# Patient Record
Sex: MALE | Race: BLACK OR AFRICAN AMERICAN | Employment: UNEMPLOYED
[De-identification: names, ages, dates, MRNs, and addresses within clinical notes are randomized per-mention and may not be internally consistent; named-entity substitution may affect disease eponyms.]

---

## 2017-03-10 ENCOUNTER — OFFICE VISIT (OUTPATIENT)
Dept: INTERNAL MEDICINE | Facility: CLINIC | Age: 47
End: 2017-03-10

## 2017-03-10 VITALS
WEIGHT: 175 LBS | SYSTOLIC BLOOD PRESSURE: 128 MMHG | BODY MASS INDEX: 27.47 KG/M2 | HEART RATE: 80 BPM | HEIGHT: 67 IN | DIASTOLIC BLOOD PRESSURE: 75 MMHG

## 2017-03-10 DIAGNOSIS — G40.909 SEIZURE DISORDER (HCC): ICD-10-CM

## 2017-03-10 DIAGNOSIS — M54.50 CHRONIC BILATERAL LOW BACK PAIN WITHOUT SCIATICA: ICD-10-CM

## 2017-03-10 DIAGNOSIS — E78.00 PURE HYPERCHOLESTEROLEMIA: Primary | ICD-10-CM

## 2017-03-10 DIAGNOSIS — Z23 NEED FOR TDAP VACCINATION: ICD-10-CM

## 2017-03-10 DIAGNOSIS — G89.29 CHRONIC BILATERAL LOW BACK PAIN WITHOUT SCIATICA: ICD-10-CM

## 2017-03-10 PROCEDURE — 99396 PREV VISIT EST AGE 40-64: CPT | Performed by: INTERNAL MEDICINE

## 2017-03-10 PROCEDURE — 90471 IMMUNIZATION ADMIN: CPT | Performed by: INTERNAL MEDICINE

## 2017-03-10 PROCEDURE — 90715 TDAP VACCINE 7 YRS/> IM: CPT | Performed by: INTERNAL MEDICINE

## 2017-03-10 RX ORDER — SENNOSIDES 8.6 MG
650 CAPSULE ORAL EVERY 8 HOURS PRN
Qty: 60 TABLET | Refills: 2 | Status: SHIPPED | OUTPATIENT
Start: 2017-03-10 | End: 2019-08-27 | Stop reason: ALTCHOICE

## 2017-03-10 RX ORDER — PHENYTOIN SODIUM 100 MG/1
100 CAPSULE, EXTENDED RELEASE ORAL 2 TIMES DAILY
Qty: 60 CAPSULE | Refills: 2 | Status: SHIPPED | OUTPATIENT
Start: 2017-03-10 | End: 2019-08-27

## 2017-03-13 ENCOUNTER — TELEPHONE (OUTPATIENT)
Dept: INTERNAL MEDICINE | Age: 47
End: 2017-03-13

## 2017-03-13 RX ORDER — SILDENAFIL 50 MG/1
50 TABLET, FILM COATED ORAL DAILY PRN
Qty: 5 TABLET | Refills: 0 | Status: SHIPPED | OUTPATIENT
Start: 2017-03-13 | End: 2019-08-27 | Stop reason: ALTCHOICE

## 2017-06-21 ENCOUNTER — TELEPHONE (OUTPATIENT)
Dept: INTERNAL MEDICINE | Age: 47
End: 2017-06-21

## 2017-10-23 ENCOUNTER — TELEPHONE (OUTPATIENT)
Dept: INTERNAL MEDICINE | Age: 47
End: 2017-10-23

## 2017-12-01 ENCOUNTER — TELEPHONE (OUTPATIENT)
Dept: INTERNAL MEDICINE | Age: 47
End: 2017-12-01

## 2017-12-01 NOTE — LETTER
[address]  [address] [facility] 29 [facility]  Phone: [phone]  Fax: [phone]    Jenny Smith MD        December 1, 2017    Derick Romeo  1932 [address] [place] [address]      Dear Mohit Gallardo:    We are sending this letter because your PCP ordered [facility] for you to have done at your last visit here and they have not yet been completed. If you can please come to our office on the 2nd floor to  your orders and have your labs completed at [facility]. If you do not have a follow-up appointment scheduled you can either contact the office to make an appointment with us or you can make one when you come in to pick-up your orders. If you have any questions or concerns, please don't hesitate to call.     Sincerely,        Jenny Smith MD

## 2019-04-23 ENCOUNTER — OFFICE VISIT (OUTPATIENT)
Dept: NEUROLOGY | Age: 49
End: 2019-04-23
Payer: MEDICARE

## 2019-04-23 VITALS
SYSTOLIC BLOOD PRESSURE: 129 MMHG | WEIGHT: 188.4 LBS | HEART RATE: 77 BPM | HEIGHT: 67 IN | DIASTOLIC BLOOD PRESSURE: 92 MMHG | BODY MASS INDEX: 29.57 KG/M2

## 2019-04-23 DIAGNOSIS — R06.83 SNORING: ICD-10-CM

## 2019-04-23 DIAGNOSIS — G40.909 SEIZURE DISORDER (HCC): ICD-10-CM

## 2019-04-23 DIAGNOSIS — R40.0 DAYTIME SLEEPINESS: ICD-10-CM

## 2019-04-23 DIAGNOSIS — R56.9 SEIZURES (HCC): ICD-10-CM

## 2019-04-23 DIAGNOSIS — R51.9 HEADACHE DISORDER: Primary | ICD-10-CM

## 2019-04-23 PROCEDURE — 1036F TOBACCO NON-USER: CPT | Performed by: PSYCHIATRY & NEUROLOGY

## 2019-04-23 PROCEDURE — 99205 OFFICE O/P NEW HI 60 MIN: CPT | Performed by: PSYCHIATRY & NEUROLOGY

## 2019-04-23 PROCEDURE — G8419 CALC BMI OUT NRM PARAM NOF/U: HCPCS | Performed by: PSYCHIATRY & NEUROLOGY

## 2019-04-23 PROCEDURE — G8427 DOCREV CUR MEDS BY ELIG CLIN: HCPCS | Performed by: PSYCHIATRY & NEUROLOGY

## 2019-04-23 RX ORDER — RIZATRIPTAN BENZOATE 5 MG/1
5 TABLET, ORALLY DISINTEGRATING ORAL
Qty: 30 TABLET | Refills: 3 | Status: SHIPPED | OUTPATIENT
Start: 2019-04-23 | End: 2019-08-27

## 2019-04-23 RX ORDER — MAGNESIUM OXIDE 400 MG/1
400 TABLET ORAL DAILY
Qty: 30 TABLET | Refills: 1 | Status: SHIPPED | OUTPATIENT
Start: 2019-04-23 | End: 2019-06-13 | Stop reason: ALTCHOICE

## 2019-04-23 NOTE — PATIENT INSTRUCTIONS
1. MRI brain w/wo  2. EEG routine  3. Magnesium oxide 400mg daily for headache prevention, hold if loose stool  4. Maxalt 5mg at time of headache is about to start, may repeat once in 2 hours, no more than two pills a day, no more than 2 days a week  5. Limit tylenol use  6. Keep headache log, seizure precaution  7. Sleep study  8.  Blood work     Return in 6-8 weeks    Yoana Das MD, MS
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
## 2019-05-16 ENCOUNTER — HOSPITAL ENCOUNTER (OUTPATIENT)
Dept: SLEEP CENTER | Age: 49
Discharge: HOME | End: 2019-05-18
Payer: MEDICARE

## 2019-05-16 DIAGNOSIS — R06.83 SNORING: ICD-10-CM

## 2019-05-16 DIAGNOSIS — R40.0 DAYTIME SLEEPINESS: ICD-10-CM

## 2019-05-16 PROCEDURE — 95810 POLYSOM 6/> YRS 4/> PARAM: CPT

## 2019-05-30 ENCOUNTER — HOSPITAL ENCOUNTER (OUTPATIENT)
Dept: NEUROLOGY | Age: 49
Discharge: HOME | End: 2019-05-30
Payer: MEDICARE

## 2019-05-30 ENCOUNTER — HOSPITAL ENCOUNTER (OUTPATIENT)
Dept: MRI IMAGING | Age: 49
Discharge: HOME | End: 2019-06-01
Payer: MEDICARE

## 2019-05-30 DIAGNOSIS — R56.9 SEIZURES (HCC): ICD-10-CM

## 2019-05-30 PROCEDURE — 95816 EEG AWAKE AND DROWSY: CPT

## 2019-05-30 PROCEDURE — 70553 MRI BRAIN STEM W/O & W/DYE: CPT

## 2019-05-30 PROCEDURE — 95816 EEG AWAKE AND DROWSY: CPT | Performed by: PSYCHIATRY & NEUROLOGY

## 2019-05-30 PROCEDURE — A9579 GAD-BASE MR CONTRAST NOS,1ML: HCPCS | Performed by: PSYCHIATRY & NEUROLOGY

## 2019-05-30 PROCEDURE — 6360000004 HC RX CONTRAST MEDICATION: Performed by: PSYCHIATRY & NEUROLOGY

## 2019-05-30 RX ADMIN — GADOTERIDOL 18 ML: 279.3 INJECTION, SOLUTION INTRAVENOUS at 10:38

## 2019-05-31 NOTE — PROCEDURES
[address] [facility] [address]   [address], [address] [facility]      ELECTROENCEPHALOGRAM REPORT        REFERRING PHYSICIAN:  Tami Spain MD  PATIENT NAME:Eugene Benavides. PATIENT MRN: [id number]  DATE OF EE2019    BRIEF HISTORY: 53year old RH AAM presented with seizure like activity, EEG to evaluate. CURRENT ANTI-EPILEPTIC MEDICATIONS: None    EEG DESCRIPTION:   During maximal wakefulness, the background was organized and continuous consisting of an admixture of frequency of alpha, beta and theta ranging between 10-50uV. There was a posterior dominant alpha rhythm at 10-11 Hz, which was symmetric and reactive to eye opening/eye closure. Low amplitude 13-18 Hz beta rhythms were seen symmetrically over the frontal-central head regions. Spontaneous variability to stimulation were present. No persistent focal asymmetries or abnormalities were seen. No epileptiform discharges were recorded. No clinical or electrographic seizures were recorded. Stage I sleep were recorded with alpha dropout, slow rolling eye movements, and high voltage centrally predominant vertex waves. Hyperventilation and photic stimulation did not activate abnormal activity. Heart rate was regular at 60-70 beats per minute on a single lead EKG. CLASSIFICATION:  Normal (Awake, drowsy)    IMPRESSION:  This was a normal routine awake and drowsy EEG. There is no epileptiform discharges or EEG seizures on this recording.      Tami Spain MD, [address] [place] [address], Neurology  Board Certified Epileptologist

## 2019-06-10 LAB — STATUS: NORMAL

## 2019-06-13 ENCOUNTER — OFFICE VISIT (OUTPATIENT)
Dept: NEUROLOGY | Age: 49
End: 2019-06-13
Payer: MEDICARE

## 2019-06-13 VITALS
DIASTOLIC BLOOD PRESSURE: 73 MMHG | HEART RATE: 75 BPM | HEIGHT: 67 IN | SYSTOLIC BLOOD PRESSURE: 117 MMHG | BODY MASS INDEX: 28.56 KG/M2 | WEIGHT: 182 LBS

## 2019-06-13 DIAGNOSIS — R51.9 HEADACHE DISORDER: Primary | ICD-10-CM

## 2019-06-13 DIAGNOSIS — M54.50 BACK PAIN AT L4-L5 LEVEL: ICD-10-CM

## 2019-06-13 DIAGNOSIS — R56.9 SEIZURE-LIKE ACTIVITY (HCC): ICD-10-CM

## 2019-06-13 PROCEDURE — G8427 DOCREV CUR MEDS BY ELIG CLIN: HCPCS | Performed by: PSYCHIATRY & NEUROLOGY

## 2019-06-13 PROCEDURE — 99214 OFFICE O/P EST MOD 30 MIN: CPT | Performed by: PSYCHIATRY & NEUROLOGY

## 2019-06-13 PROCEDURE — 1036F TOBACCO NON-USER: CPT | Performed by: PSYCHIATRY & NEUROLOGY

## 2019-06-13 PROCEDURE — G8419 CALC BMI OUT NRM PARAM NOF/U: HCPCS | Performed by: PSYCHIATRY & NEUROLOGY

## 2019-06-13 RX ORDER — TOPIRAMATE 25 MG/1
TABLET ORAL
Qty: 120 TABLET | Refills: 2 | Status: SHIPPED | OUTPATIENT
Start: 2019-06-13 | End: 2019-08-27
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
## 2019-08-27 ENCOUNTER — OFFICE VISIT (OUTPATIENT)
Dept: NEUROLOGY | Age: 49
End: 2019-08-27
Payer: MEDICARE

## 2019-08-27 VITALS
HEART RATE: 60 BPM | HEIGHT: 67 IN | SYSTOLIC BLOOD PRESSURE: 128 MMHG | DIASTOLIC BLOOD PRESSURE: 70 MMHG | WEIGHT: 180 LBS | BODY MASS INDEX: 28.25 KG/M2

## 2019-08-27 DIAGNOSIS — M54.50 BACK PAIN AT L4-L5 LEVEL: ICD-10-CM

## 2019-08-27 DIAGNOSIS — R56.9 SEIZURE-LIKE ACTIVITY (HCC): ICD-10-CM

## 2019-08-27 DIAGNOSIS — R51.9 HEADACHE DISORDER: Primary | ICD-10-CM

## 2019-08-27 PROCEDURE — G8428 CUR MEDS NOT DOCUMENT: HCPCS | Performed by: PSYCHIATRY & NEUROLOGY

## 2019-08-27 PROCEDURE — 1036F TOBACCO NON-USER: CPT | Performed by: PSYCHIATRY & NEUROLOGY

## 2019-08-27 PROCEDURE — 99214 OFFICE O/P EST MOD 30 MIN: CPT | Performed by: PSYCHIATRY & NEUROLOGY

## 2019-08-27 PROCEDURE — G8419 CALC BMI OUT NRM PARAM NOF/U: HCPCS | Performed by: PSYCHIATRY & NEUROLOGY

## 2019-08-27 RX ORDER — LIDOCAINE 5% 5 G/100G
30 CREAM TOPICAL 4 TIMES DAILY
Qty: 60 G | Refills: 5 | Status: SHIPPED | OUTPATIENT
Start: 2019-08-27 | End: 2019-08-28 | Stop reason: SDUPTHER

## 2019-08-27 NOTE — PATIENT INSTRUCTIONS
1. LTME   2. Vitamin B2 for headache prevention  3. Continue maxalat 5mg at time of headache is about to start, may repeat once in 2 hours, no more than 2 days a week  4.  Lidocaine cream to your back 4-5 times a day    Return after Omari Israel MD, MS
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
## 2019-08-28 RX ORDER — LIDOCAINE 40 MG/G
CREAM TOPICAL
Qty: 45 G | Refills: 5 | Status: SHIPPED | OUTPATIENT
Start: 2019-08-28

## 2019-09-12 ENCOUNTER — TELEPHONE (OUTPATIENT)
Dept: NEUROLOGY | Age: 49
End: 2019-09-12

## 2019-09-12 NOTE — TELEPHONE ENCOUNTER
Dr. Debo Allred had ordered LTME for Mr. Amber Núñez at his last follow up. I had discussed this with the patient that same day and he scheduled the LTME for 9/17/19. I placed a call to Mr. Amber Núñez today to confirm his admission for next week. Patient stated that he did not want to do this right now. I asked if he wanted to reschedule and he stated \"ya but not right now I'm not feeling good\". Patient stated that he would call me back to reschedule.

## 2019-10-26 ENCOUNTER — HOSPITAL ENCOUNTER (EMERGENCY)
Age: 49
Discharge: HOME | End: 2019-10-26
Attending: EMERGENCY MEDICINE
Payer: MEDICARE

## 2019-10-26 ENCOUNTER — APPOINTMENT (OUTPATIENT)
Dept: GENERAL RADIOLOGY | Age: 49
End: 2019-10-26
Payer: MEDICARE

## 2019-10-26 VITALS
SYSTOLIC BLOOD PRESSURE: 157 MMHG | RESPIRATION RATE: 18 BRPM | HEART RATE: 76 BPM | HEIGHT: 66 IN | TEMPERATURE: 98 F | OXYGEN SATURATION: 98 % | DIASTOLIC BLOOD PRESSURE: 83 MMHG | BODY MASS INDEX: 28.93 KG/M2 | WEIGHT: 180 LBS

## 2019-10-26 DIAGNOSIS — S39.012A STRAIN OF LUMBAR REGION, INITIAL ENCOUNTER: ICD-10-CM
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
## 2019-10-26 PROCEDURE — 99284 EMERGENCY DEPT VISIT MOD MDM: CPT

## 2019-10-26 PROCEDURE — 72100 X-RAY EXAM L-S SPINE 2/3 VWS: CPT

## 2019-10-26 RX ORDER — METHOCARBAMOL 750 MG/1
750 TABLET, FILM COATED ORAL 3 TIMES DAILY
Qty: 30 TABLET | Refills: 0 | Status: SHIPPED | OUTPATIENT
Start: 2019-10-26

## 2019-10-26 RX ORDER — IBUPROFEN 800 MG/1
800 TABLET ORAL EVERY 8 HOURS PRN
Qty: 15 TABLET | Refills: 0 | Status: SHIPPED | OUTPATIENT
Start: 2019-10-26

## 2019-10-26 ASSESSMENT — PAIN DESCRIPTION - ORIENTATION: ORIENTATION: LOWER

## 2019-10-26 ASSESSMENT — ENCOUNTER SYMPTOMS
COLOR CHANGE: 0
FACIAL SWELLING: 0
BACK PAIN: 1
SHORTNESS OF BREATH: 0
ABDOMINAL PAIN: 0
NAUSEA: 0
TROUBLE SWALLOWING: 0
PHOTOPHOBIA: 0
EYE PAIN: 0
VOICE CHANGE: 0
VOMITING: 0

## 2019-10-26 ASSESSMENT — PAIN SCALES - GENERAL: PAINLEVEL_OUTOF10: 8

## 2019-10-26 ASSESSMENT — PAIN DESCRIPTION - LOCATION: LOCATION: BACK

## 2019-10-26 ASSESSMENT — PAIN DESCRIPTION - DESCRIPTORS: DESCRIPTORS: ACHING;SHARP;STABBING

## 2019-10-26 ASSESSMENT — PAIN DESCRIPTION - PAIN TYPE: TYPE: ACUTE PAIN;CHRONIC PAIN

## 2021-08-17 ENCOUNTER — OFFICE VISIT (OUTPATIENT)
Dept: URBAN - METROPOLITAN AREA CLINIC 46 | Facility: CLINIC | Age: 51
End: 2021-08-17

## 2021-08-28 ENCOUNTER — TELEPHONE ENCOUNTER (OUTPATIENT)
Dept: URBAN - METROPOLITAN AREA CLINIC 13 | Facility: CLINIC | Age: 51
End: 2021-08-28

## 2021-08-29 ENCOUNTER — TELEPHONE ENCOUNTER (OUTPATIENT)
Dept: URBAN - METROPOLITAN AREA CLINIC 13 | Facility: CLINIC | Age: 51
End: 2021-08-29

## 2024-02-07 ENCOUNTER — APPOINTMENT (OUTPATIENT)
Dept: GENERAL RADIOLOGY | Age: 54
End: 2024-02-07
Payer: COMMERCIAL

## 2024-02-07 ENCOUNTER — HOSPITAL ENCOUNTER (EMERGENCY)
Age: 54
Discharge: HOME | End: 2024-02-07
Attending: EMERGENCY MEDICINE
Payer: COMMERCIAL

## 2024-02-07 VITALS
BODY MASS INDEX: 28.25 KG/M2 | RESPIRATION RATE: 18 BRPM | HEART RATE: 90 BPM | DIASTOLIC BLOOD PRESSURE: 102 MMHG | OXYGEN SATURATION: 96 % | WEIGHT: 180 LBS | HEIGHT: 67 IN | TEMPERATURE: 98.2 F | SYSTOLIC BLOOD PRESSURE: 166 MMHG

## 2024-02-07 DIAGNOSIS — M67.911 ROTATOR CUFF DISORDER, RIGHT: Primary | ICD-10-CM

## 2024-02-07 PROCEDURE — 73030 X-RAY EXAM OF SHOULDER: CPT

## 2024-02-07 PROCEDURE — 6370000000 HC RX 637 (ALT 250 FOR IP): Performed by: EMERGENCY MEDICINE

## 2024-02-07 PROCEDURE — 99283 EMERGENCY DEPT VISIT LOW MDM: CPT

## 2024-02-07 RX ORDER — IBUPROFEN 800 MG/1
800 TABLET ORAL ONCE
Status: COMPLETED | OUTPATIENT
Start: 2024-02-07 | End: 2024-02-07

## 2024-02-07 RX ORDER — ACETAMINOPHEN 500 MG
1000 TABLET ORAL ONCE
Status: COMPLETED | OUTPATIENT
Start: 2024-02-07 | End: 2024-02-07

## 2024-02-07 RX ORDER — LIDOCAINE 4 G/G
1 PATCH TOPICAL ONCE
Status: DISCONTINUED | OUTPATIENT
Start: 2024-02-07 | End: 2024-02-07 | Stop reason: HOSPADM

## 2024-02-07 RX ORDER — LIDOCAINE 50 MG/G
1 PATCH TOPICAL DAILY
Qty: 7 PATCH | Refills: 0 | Status: SHIPPED | OUTPATIENT
Start: 2024-02-07 | End: 2024-02-14

## 2024-02-07 RX ORDER — IBUPROFEN 600 MG/1
600 TABLET ORAL EVERY 6 HOURS PRN
Qty: 20 TABLET | Refills: 0 | Status: SHIPPED | OUTPATIENT
Start: 2024-02-07 | End: 2024-02-12

## 2024-02-07 RX ORDER — ACETAMINOPHEN 500 MG
1000 TABLET ORAL EVERY 6 HOURS PRN
Qty: 42 TABLET | Refills: 0 | Status: SHIPPED | OUTPATIENT
Start: 2024-02-07 | End: 2024-02-12

## 2024-02-07 RX ADMIN — ACETAMINOPHEN 1000 MG: 500 TABLET ORAL at 14:47

## 2024-02-07 RX ADMIN — IBUPROFEN 800 MG: 800 TABLET, FILM COATED ORAL at 14:48

## 2024-02-07 ASSESSMENT — PAIN SCALES - GENERAL: PAINLEVEL_OUTOF10: 7

## 2024-02-07 ASSESSMENT — ENCOUNTER SYMPTOMS
SHORTNESS OF BREATH: 0
ABDOMINAL PAIN: 0

## 2024-02-07 ASSESSMENT — PAIN - FUNCTIONAL ASSESSMENT: PAIN_FUNCTIONAL_ASSESSMENT: 0-10

## 2024-02-07 NOTE — DISCHARGE INSTRUCTIONS
You were seen here for pain in your right shoulder.  This is likely secondary to a rotator cuff injury.  Your x-ray was unremarkable.  You were provided exercises in the back of this packet that will help improve the pain in your right shoulder.    You need to call and schedule follow-up appointment with your primary care provider for as soon as possible for further management of this right shoulder pain.    Return to the emergency department immediately if you experience worsening symptoms, develop any new symptoms, or if you have any other concerns.

## 2024-02-07 NOTE — ED PROVIDER NOTES
[facility] ED  Emergency Department Encounter  Emergency Medicine Resident     Pt Name:Eugene Ross Jr.  MRN: [id number]  Birthdate 1970  Date of evaluation: 2/7/24  PCP:  No primary care provider on file.  Note Started: 2:10 PM EST      CHIEF COMPLAINT       Chief Complaint   Patient presents with    Arm Pain       HISTORY OF PRESENT ILLNESS  (Location/Symptom, Timing/Onset, Context/Setting, Quality, Duration, Modifying Factors, Severity.)      Eugene Ross Jr. is a 53 y.o. male who presents with right shoulder pain.  Patient states he has been pain in the right shoulder for the past 2 weeks.  Patient denies any recent fall or trauma to the shoulder.  Patient is right-hand dominant.  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  Patient denies any chest pain or difficulty breathing.  Only complaint is localized pain in the right shoulder which is worse once he raises his arm above 90 degrees.    PAST MEDICAL / SURGICAL / SOCIAL / FAMILY HISTORY      has a past medical history of Acute kidney injury (HCC), Allergic rhinitis, Chronic back pain, Hyperlipidemia, Seizures (HCC), and Urinary incontinence.     has no past surgical history on file.    Social History     Socioeconomic History    Marital status: Single     Spouse name: Not on file    Number of children: Not on file    Years of education: Not on file    Highest education level: Not on file   Occupational History    Not on file   Tobacco Use    Smoking status: Never    Smokeless tobacco: Never   Vaping Use    Vaping Use: Never used   Substance and Sexual Activity    Alcohol use: Not Currently     Alcohol/week: 0.0 standard drinks of alcohol     Comment: occasionally in past    Drug use: No    Sexual activity: Yes     Partners: Female   Other Topics Concern    Not on file   Social History Narrative    Not on file     Social Determinants of Health  rhythm.      Pulses: Normal pulses.   Pulmonary:      Effort: Pulmonary effort is normal.      Breath sounds: Normal breath sounds.   Abdominal:      General: There is no distension.      Palpations: Abdomen is soft.      Tenderness: There is no abdominal tenderness. There is no guarding or rebound.   Musculoskeletal:      Comments: Range of motion intact.  Pain elicited once arm is abducted more than 90 degrees.  Pain elicited with other rotator cuff maneuvers as well.  No obvious deformity noted to the shoulder.  No swelling, warmth, or erythema noted.  Neurovascularly intact distally.   Skin:     General: Skin is warm.      Capillary Refill: Capillary refill takes less than 2 seconds.   Neurological:      Mental Status: He is alert and oriented to person, place, and time.   Psychiatric:         Mood and Affect: Mood normal.       DDX/DIAGNOSTIC RESULTS / EMERGENCY DEPARTMENT COURSE / MDM     Medical Decision Making  53-year-old male, presents to the emergency department due to right shoulder pain.  Patient states he has been pain in the right shoulder for the past 2 weeks.  Patient denies any recent fall or trauma to the shoulder.  Patient is right-hand dominant.  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  Patient denies any chest pain or difficulty breathing.  Only complaint is localized pain in the right shoulder which is worse once he raises his arm above 90 degrees.    On evaluation, patient is well-appearing, nontoxic, afebrile.  Lung sounds are clear and equal bilaterally, abdomen soft nontender.  Evaluation of the right shoulder does not reveal any deformity, swelling, warmth, or erythema.  Range of motion intact however there is pain elicited once arm is abducted more than 90 degrees.  Neurovascularly intact distally.  Discussed with patient that this is likely a rotator cuff injury given that he has repetitive

## 2024-02-07 NOTE — ED PROVIDER NOTES
[facility] ED     Emergency Department     Faculty Attestation    I performed a history and physical examination of the patient and discussed management with the resident. I reviewed the resident’s note and agree with the documented findings and plan of care. Any areas of disagreement are noted on the chart. I was personally present for the key portions of any procedures. I have documented in the chart those procedures where I was not present during the key portions. I have reviewed the emergency nurses triage note. I agree with the chief complaint, past medical history, past surgical history, allergies, medications, social and family history as documented unless otherwise noted below. For Physician Assistant/ Nurse Practitioner cases/documentation I have personally evaluated this patient and have completed at least one if not all key elements of the E/M (history, physical exam, and MDM). Additional findings are as noted.    Note Started: 2:17 PM EST    Patient with right shoulder pain rating down the right arm no neck pain no chest pain trouble breathing.  Is right-handed.  No injury or trauma.  On exam decreased range of motion with spasm does have pain with testing rotator cuff but no deficits no deformity.  Distally strong pulses no neurodeficits.  Given no prior history will image plan discharge will need follow-up as an outpatient for rotator cuff pathology unless imaging reveals other etiology      Critical Care     none    Marty Arias MD, FACEP, FAAEM  Attending Emergency  Physician           Marty Arias MD  02/07/24 [id number]

## 2024-02-20 ENCOUNTER — APPOINTMENT (OUTPATIENT)
Dept: CT IMAGING | Age: 54
DRG: 053 | End: 2024-02-20
Payer: MEDICAID

## 2024-02-20 ENCOUNTER — HOSPITAL ENCOUNTER (INPATIENT)
Age: 54
LOS: 2 days | Discharge: HOME | DRG: 053 | End: 2024-02-22
Attending: EMERGENCY MEDICINE | Admitting: PSYCHIATRY & NEUROLOGY
Payer: MEDICAID

## 2024-02-20 DIAGNOSIS — R51.9 ACUTE NONINTRACTABLE HEADACHE, UNSPECIFIED HEADACHE TYPE: Primary | ICD-10-CM

## 2024-02-20 DIAGNOSIS — R56.9 SEIZURE (HCC): ICD-10-CM

## 2024-02-20 DIAGNOSIS — R56.9 SEIZURES (HCC): ICD-10-CM

## 2024-02-20 LAB
ALBUMIN SERPL-MCNC: 4.3 G/DL (ref 3.5–5.2)
ALBUMIN/GLOB SERPL: 1.3 {RATIO} (ref 1–2.5)
ALP SERPL-CCNC: 116 U/L (ref 40–129)
ALT SERPL-CCNC: 30 U/L (ref 5–41)
ANION GAP SERPL CALCULATED.3IONS-SCNC: 13 MMOL/L (ref 9–17)
APAP SERPL-MCNC: <5 UG/ML (ref 10–30)
AST SERPL-CCNC: 23 U/L
BASOPHILS # BLD: 0.04 K/UL (ref 0–0.2)
BASOPHILS NFR BLD: 1 % (ref 0–2)
BILIRUB SERPL-MCNC: 0.2 MG/DL (ref 0.3–1.2)
BILIRUB UR QL STRIP: NEGATIVE
BUN SERPL-MCNC: 10 MG/DL (ref 6–20)
CALCIUM SERPL-MCNC: 9.5 MG/DL (ref 8.6–10.4)
CHLORIDE SERPL-SCNC: 100 MMOL/L (ref 98–107)
CK SERPL-CCNC: 129 U/L (ref 39–308)
CLARITY UR: CLEAR
CO2 SERPL-SCNC: 24 MMOL/L (ref 20–31)
COLOR UR: YELLOW
COMMENT: NORMAL
CREAT SERPL-MCNC: 0.9 MG/DL (ref 0.7–1.2)
EOSINOPHIL # BLD: 0.1 K/UL (ref 0–0.44)
EOSINOPHILS RELATIVE PERCENT: 2 % (ref 1–4)
ERYTHROCYTE [DISTWIDTH] IN BLOOD BY AUTOMATED COUNT: 12.3 % (ref 11.8–14.4)
ETHANOL PERCENT: <0.01 %
ETHANOLAMINE SERPL-MCNC: <10 MG/DL
GFR SERPL CREATININE-BSD FRML MDRD: >60 ML/MIN/1.73M2
GLUCOSE SERPL-MCNC: 106 MG/DL (ref 70–99)
GLUCOSE UR STRIP-MCNC: NEGATIVE MG/DL
HCT VFR BLD AUTO: 47 % (ref 40.7–50.3)
HGB BLD-MCNC: 15.7 G/DL (ref 13–17)
HGB UR QL STRIP.AUTO: NEGATIVE
IMM GRANULOCYTES # BLD AUTO: <0.03 K/UL (ref 0–0.3)
IMM GRANULOCYTES NFR BLD: 0 %
KETONES UR STRIP-MCNC: NEGATIVE MG/DL
LEUKOCYTE ESTERASE UR QL STRIP: NEGATIVE
LYMPHOCYTES NFR BLD: 2.94 K/UL (ref 1.1–3.7)
LYMPHOCYTES RELATIVE PERCENT: 47 % (ref 24–43)
MCH RBC QN AUTO: 29 PG (ref 25.2–33.5)
MCHC RBC AUTO-ENTMCNC: 33.4 G/DL (ref 28.4–34.8)
MCV RBC AUTO: 86.9 FL (ref 82.6–102.9)
MONOCYTES NFR BLD: 0.34 K/UL (ref 0.1–1.2)
MONOCYTES NFR BLD: 6 % (ref 3–12)
MYOGLOBIN SERPL-MCNC: 23 NG/ML (ref 28–72)
NEUTROPHILS NFR BLD: 44 % (ref 36–65)
NEUTS SEG NFR BLD: 2.73 K/UL (ref 1.5–8.1)
NITRITE UR QL STRIP: NEGATIVE
NRBC BLD-RTO: 0 PER 100 WBC
PH UR STRIP: 7 [PH] (ref 5–8)
PLATELET # BLD AUTO: 303 K/UL (ref 138–453)
PMV BLD AUTO: 9 FL (ref 8.1–13.5)
POTASSIUM SERPL-SCNC: 3.7 MMOL/L (ref 3.7–5.3)
PROT SERPL-MCNC: 7.6 G/DL (ref 6.4–8.3)
PROT UR STRIP-MCNC: NEGATIVE MG/DL
RBC # BLD AUTO: 5.41 M/UL (ref 4.21–5.77)
SALICYLATES SERPL-MCNC: <1 MG/DL (ref 3–10)
SODIUM SERPL-SCNC: 137 MMOL/L (ref 135–144)
SP GR UR STRIP: 1.02 (ref 1–1.03)
TOXIC TRICYCLIC SC,BLOOD: NEGATIVE
UROBILINOGEN UR STRIP-ACNC: NORMAL EU/DL (ref 0–1)
WBC OTHER # BLD: 6.2 K/UL (ref 3.5–11.3)

## 2024-02-20 PROCEDURE — 83874 ASSAY OF MYOGLOBIN: CPT

## 2024-02-20 PROCEDURE — 80307 DRUG TEST PRSMV CHEM ANLYZR: CPT

## 2024-02-20 PROCEDURE — 2580000003 HC RX 258: Performed by: STUDENT IN AN ORGANIZED HEALTH CARE EDUCATION/TRAINING PROGRAM

## 2024-02-20 PROCEDURE — 80053 COMPREHEN METABOLIC PANEL: CPT

## 2024-02-20 PROCEDURE — 80179 DRUG ASSAY SALICYLATE: CPT

## 2024-02-20 PROCEDURE — 2060000000 HC ICU INTERMEDIATE R&B

## 2024-02-20 PROCEDURE — 93005 ELECTROCARDIOGRAM TRACING: CPT | Performed by: STUDENT IN AN ORGANIZED HEALTH CARE EDUCATION/TRAINING PROGRAM

## 2024-02-20 PROCEDURE — 82550 ASSAY OF CK (CPK): CPT

## 2024-02-20 PROCEDURE — 80143 DRUG ASSAY ACETAMINOPHEN: CPT

## 2024-02-20 PROCEDURE — 70450 CT HEAD/BRAIN W/O DYE: CPT

## 2024-02-20 PROCEDURE — 85025 COMPLETE CBC W/AUTO DIFF WBC: CPT

## 2024-02-20 PROCEDURE — 99285 EMERGENCY DEPT VISIT HI MDM: CPT

## 2024-02-20 PROCEDURE — 96365 THER/PROPH/DIAG IV INF INIT: CPT

## 2024-02-20 PROCEDURE — 96366 THER/PROPH/DIAG IV INF ADDON: CPT

## 2024-02-20 PROCEDURE — 81003 URINALYSIS AUTO W/O SCOPE: CPT

## 2024-02-20 PROCEDURE — 96375 TX/PRO/DX INJ NEW DRUG ADDON: CPT

## 2024-02-20 PROCEDURE — G0480 DRUG TEST DEF 1-7 CLASSES: HCPCS

## 2024-02-20 PROCEDURE — 6360000002 HC RX W HCPCS: Performed by: STUDENT IN AN ORGANIZED HEALTH CARE EDUCATION/TRAINING PROGRAM

## 2024-02-20 RX ORDER — LEVETIRACETAM 10 MG/ML
1000 INJECTION INTRAVASCULAR ONCE
Status: COMPLETED | OUTPATIENT
Start: 2024-02-20 | End: 2024-02-20

## 2024-02-20 RX ORDER — DIPHENHYDRAMINE HYDROCHLORIDE 50 MG/ML
25 INJECTION INTRAMUSCULAR; INTRAVENOUS ONCE
Status: COMPLETED | OUTPATIENT
Start: 2024-02-20 | End: 2024-02-20

## 2024-02-20 RX ORDER — METOCLOPRAMIDE HYDROCHLORIDE 5 MG/ML
10 INJECTION INTRAMUSCULAR; INTRAVENOUS ONCE
Status: COMPLETED | OUTPATIENT
Start: 2024-02-20 | End: 2024-02-20

## 2024-02-20 RX ORDER — SODIUM CHLORIDE, SODIUM LACTATE, POTASSIUM CHLORIDE, AND CALCIUM CHLORIDE .6; .31; .03; .02 G/100ML; G/100ML; G/100ML; G/100ML
1000 INJECTION, SOLUTION INTRAVENOUS ONCE
Status: COMPLETED | OUTPATIENT
Start: 2024-02-20 | End: 2024-02-20

## 2024-02-20 RX ADMIN — LEVETIRACETAM 1000 MG: 10 INJECTION, SOLUTION INTRAVENOUS at 20:40

## 2024-02-20 RX ADMIN — SODIUM CHLORIDE, POTASSIUM CHLORIDE, SODIUM LACTATE AND CALCIUM CHLORIDE 1000 ML: 600; 310; 30; 20 INJECTION, SOLUTION INTRAVENOUS at 20:44

## 2024-02-20 RX ADMIN — DIPHENHYDRAMINE HYDROCHLORIDE 25 MG: 50 INJECTION INTRAMUSCULAR; INTRAVENOUS at 20:47

## 2024-02-20 RX ADMIN — METOCLOPRAMIDE 10 MG: 5 INJECTION, SOLUTION INTRAMUSCULAR; INTRAVENOUS at 20:45

## 2024-02-20 ASSESSMENT — PAIN - FUNCTIONAL ASSESSMENT: PAIN_FUNCTIONAL_ASSESSMENT: 0-10

## 2024-02-20 ASSESSMENT — PAIN SCALES - GENERAL
PAINLEVEL_OUTOF10: 1
PAINLEVEL_OUTOF10: 9

## 2024-02-20 ASSESSMENT — PAIN DESCRIPTION - LOCATION: LOCATION: HEAD

## 2024-02-21 ENCOUNTER — APPOINTMENT (OUTPATIENT)
Dept: MRI IMAGING | Age: 54
DRG: 053 | End: 2024-02-21
Payer: MEDICAID

## 2024-02-21 PROBLEM — G43.009 MIGRAINE WITHOUT AURA AND WITHOUT STATUS MIGRAINOSUS, NOT INTRACTABLE: Status: ACTIVE | Noted: 2024-02-21

## 2024-02-21 LAB
AMPHET UR QL SCN: NEGATIVE
ANION GAP SERPL CALCULATED.3IONS-SCNC: 8 MMOL/L (ref 9–17)
BARBITURATES UR QL SCN: NEGATIVE
BASOPHILS # BLD: 0.04 K/UL (ref 0–0.2)
BASOPHILS NFR BLD: 1 % (ref 0–2)
BENZODIAZ UR QL: NEGATIVE
BUN SERPL-MCNC: 12 MG/DL (ref 6–20)
CALCIUM SERPL-MCNC: 9 MG/DL (ref 8.6–10.4)
CANNABINOIDS UR QL SCN: NEGATIVE
CHLORIDE SERPL-SCNC: 101 MMOL/L (ref 98–107)
CO2 SERPL-SCNC: 26 MMOL/L (ref 20–31)
COCAINE UR QL SCN: NEGATIVE
CREAT SERPL-MCNC: 0.8 MG/DL (ref 0.7–1.2)
EOSINOPHIL # BLD: 0.14 K/UL (ref 0–0.44)
EOSINOPHILS RELATIVE PERCENT: 2 % (ref 1–4)
ERYTHROCYTE [DISTWIDTH] IN BLOOD BY AUTOMATED COUNT: 12.2 % (ref 11.8–14.4)
FENTANYL UR QL: NEGATIVE
GFR SERPL CREATININE-BSD FRML MDRD: >60 ML/MIN/1.73M2
GLUCOSE SERPL-MCNC: 105 MG/DL (ref 70–99)
HCT VFR BLD AUTO: 44.4 % (ref 40.7–50.3)
HGB BLD-MCNC: 14.7 G/DL (ref 13–17)
IMM GRANULOCYTES # BLD AUTO: <0.03 K/UL (ref 0–0.3)
IMM GRANULOCYTES NFR BLD: 0 %
LYMPHOCYTES NFR BLD: 2.79 K/UL (ref 1.1–3.7)
LYMPHOCYTES RELATIVE PERCENT: 49 % (ref 24–43)
MCH RBC QN AUTO: 28.5 PG (ref 25.2–33.5)
MCHC RBC AUTO-ENTMCNC: 33.1 G/DL (ref 28.4–34.8)
MCV RBC AUTO: 86.2 FL (ref 82.6–102.9)
METHADONE UR QL: NEGATIVE
MONOCYTES NFR BLD: 0.37 K/UL (ref 0.1–1.2)
MONOCYTES NFR BLD: 6 % (ref 3–12)
NEUTROPHILS NFR BLD: 42 % (ref 36–65)
NEUTS SEG NFR BLD: 2.46 K/UL (ref 1.5–8.1)
NRBC BLD-RTO: 0 PER 100 WBC
OPIATES UR QL SCN: NEGATIVE
OXYCODONE UR QL SCN: NEGATIVE
PCP UR QL SCN: NEGATIVE
PLATELET # BLD AUTO: 266 K/UL (ref 138–453)
PMV BLD AUTO: 8.9 FL (ref 8.1–13.5)
POTASSIUM SERPL-SCNC: 3.7 MMOL/L (ref 3.7–5.3)
RBC # BLD AUTO: 5.15 M/UL (ref 4.21–5.77)
SODIUM SERPL-SCNC: 135 MMOL/L (ref 135–144)
TEST INFORMATION: NORMAL
WBC OTHER # BLD: 5.8 K/UL (ref 3.5–11.3)

## 2024-02-21 PROCEDURE — 6370000000 HC RX 637 (ALT 250 FOR IP)

## 2024-02-21 PROCEDURE — 6370000000 HC RX 637 (ALT 250 FOR IP): Performed by: STUDENT IN AN ORGANIZED HEALTH CARE EDUCATION/TRAINING PROGRAM

## 2024-02-21 PROCEDURE — 6360000002 HC RX W HCPCS

## 2024-02-21 PROCEDURE — 80048 BASIC METABOLIC PNL TOTAL CA: CPT

## 2024-02-21 PROCEDURE — 2580000003 HC RX 258

## 2024-02-21 PROCEDURE — 85025 COMPLETE CBC W/AUTO DIFF WBC: CPT

## 2024-02-21 PROCEDURE — A9576 INJ PROHANCE MULTIPACK: HCPCS

## 2024-02-21 PROCEDURE — 2580000003 HC RX 258: Performed by: STUDENT IN AN ORGANIZED HEALTH CARE EDUCATION/TRAINING PROGRAM

## 2024-02-21 PROCEDURE — 6360000004 HC RX CONTRAST MEDICATION

## 2024-02-21 PROCEDURE — 99223 1ST HOSP IP/OBS HIGH 75: CPT | Performed by: PSYCHIATRY & NEUROLOGY

## 2024-02-21 PROCEDURE — 70553 MRI BRAIN STEM W/O & W/DYE: CPT

## 2024-02-21 PROCEDURE — 2500000003 HC RX 250 WO HCPCS: Performed by: STUDENT IN AN ORGANIZED HEALTH CARE EDUCATION/TRAINING PROGRAM

## 2024-02-21 PROCEDURE — 2060000000 HC ICU INTERMEDIATE R&B

## 2024-02-21 RX ORDER — SODIUM CHLORIDE 0.9 % (FLUSH) 0.9 %
5-40 SYRINGE (ML) INJECTION EVERY 12 HOURS SCHEDULED
Status: DISCONTINUED | OUTPATIENT
Start: 2024-02-21 | End: 2024-02-22 | Stop reason: HOSPADM

## 2024-02-21 RX ORDER — POLYETHYLENE GLYCOL 3350 17 G/17G
17 POWDER, FOR SOLUTION ORAL DAILY PRN
Status: DISCONTINUED | OUTPATIENT
Start: 2024-02-21 | End: 2024-02-22 | Stop reason: HOSPADM

## 2024-02-21 RX ORDER — ENOXAPARIN SODIUM 100 MG/ML
40 INJECTION SUBCUTANEOUS DAILY
Status: DISCONTINUED | OUTPATIENT
Start: 2024-02-21 | End: 2024-02-22 | Stop reason: HOSPADM

## 2024-02-21 RX ORDER — SODIUM CHLORIDE 0.9 % (FLUSH) 0.9 %
10 SYRINGE (ML) INJECTION PRN
Status: DISCONTINUED | OUTPATIENT
Start: 2024-02-21 | End: 2024-02-22 | Stop reason: HOSPADM

## 2024-02-21 RX ORDER — ONDANSETRON 4 MG/1
4 TABLET, ORALLY DISINTEGRATING ORAL EVERY 8 HOURS PRN
Status: DISCONTINUED | OUTPATIENT
Start: 2024-02-21 | End: 2024-02-22 | Stop reason: HOSPADM

## 2024-02-21 RX ORDER — POTASSIUM CHLORIDE 7.45 MG/ML
10 INJECTION INTRAVENOUS PRN
Status: DISCONTINUED | OUTPATIENT
Start: 2024-02-21 | End: 2024-02-22 | Stop reason: HOSPADM

## 2024-02-21 RX ORDER — ACETAMINOPHEN 650 MG/1
650 SUPPOSITORY RECTAL EVERY 6 HOURS PRN
Status: DISCONTINUED | OUTPATIENT
Start: 2024-02-21 | End: 2024-02-22 | Stop reason: HOSPADM

## 2024-02-21 RX ORDER — DIVALPROEX SODIUM 500 MG/1
500 TABLET, DELAYED RELEASE ORAL EVERY 12 HOURS SCHEDULED
Status: DISCONTINUED | OUTPATIENT
Start: 2024-02-21 | End: 2024-02-22 | Stop reason: HOSPADM

## 2024-02-21 RX ORDER — LEVETIRACETAM 500 MG/1
500 TABLET ORAL EVERY 12 HOURS
Status: DISCONTINUED | OUTPATIENT
Start: 2024-02-21 | End: 2024-02-21

## 2024-02-21 RX ORDER — ACETAMINOPHEN 325 MG/1
650 TABLET ORAL EVERY 6 HOURS PRN
Status: DISCONTINUED | OUTPATIENT
Start: 2024-02-21 | End: 2024-02-22 | Stop reason: HOSPADM

## 2024-02-21 RX ORDER — POTASSIUM CHLORIDE 20 MEQ/1
40 TABLET, EXTENDED RELEASE ORAL PRN
Status: DISCONTINUED | OUTPATIENT
Start: 2024-02-21 | End: 2024-02-22 | Stop reason: HOSPADM

## 2024-02-21 RX ORDER — LORAZEPAM 2 MG/ML
4 INJECTION INTRAMUSCULAR EVERY 5 MIN PRN
Status: DISCONTINUED | OUTPATIENT
Start: 2024-02-21 | End: 2024-02-22 | Stop reason: HOSPADM

## 2024-02-21 RX ORDER — SODIUM CHLORIDE 0.9 % (FLUSH) 0.9 %
5-40 SYRINGE (ML) INJECTION PRN
Status: DISCONTINUED | OUTPATIENT
Start: 2024-02-21 | End: 2024-02-22 | Stop reason: HOSPADM

## 2024-02-21 RX ORDER — MAGNESIUM SULFATE IN WATER 40 MG/ML
2000 INJECTION, SOLUTION INTRAVENOUS PRN
Status: DISCONTINUED | OUTPATIENT
Start: 2024-02-21 | End: 2024-02-22 | Stop reason: HOSPADM

## 2024-02-21 RX ORDER — SODIUM CHLORIDE 9 MG/ML
INJECTION, SOLUTION INTRAVENOUS PRN
Status: DISCONTINUED | OUTPATIENT
Start: 2024-02-21 | End: 2024-02-22 | Stop reason: HOSPADM

## 2024-02-21 RX ORDER — ONDANSETRON 2 MG/ML
4 INJECTION INTRAMUSCULAR; INTRAVENOUS EVERY 6 HOURS PRN
Status: DISCONTINUED | OUTPATIENT
Start: 2024-02-21 | End: 2024-02-22 | Stop reason: HOSPADM

## 2024-02-21 RX ADMIN — SODIUM CHLORIDE, PRESERVATIVE FREE 10 ML: 5 INJECTION INTRAVENOUS at 08:55

## 2024-02-21 RX ADMIN — SODIUM CHLORIDE, PRESERVATIVE FREE 10 ML: 5 INJECTION INTRAVENOUS at 08:22

## 2024-02-21 RX ADMIN — LEVETIRACETAM 500 MG: 500 TABLET, FILM COATED ORAL at 08:22

## 2024-02-21 RX ADMIN — ENOXAPARIN SODIUM 40 MG: 100 INJECTION SUBCUTANEOUS at 08:22

## 2024-02-21 RX ADMIN — GADOTERIDOL 15 ML: 279.3 INJECTION, SOLUTION INTRAVENOUS at 08:54

## 2024-02-21 RX ADMIN — ACETAMINOPHEN 325MG 650 MG: 325 TABLET ORAL at 05:29

## 2024-02-21 RX ADMIN — ACETAMINOPHEN 325MG 650 MG: 325 TABLET ORAL at 20:24

## 2024-02-21 RX ADMIN — SODIUM CHLORIDE, PRESERVATIVE FREE 10 ML: 5 INJECTION INTRAVENOUS at 20:20

## 2024-02-21 RX ADMIN — SODIUM CHLORIDE 1000 MG: 9 INJECTION, SOLUTION INTRAVENOUS at 17:06

## 2024-02-21 RX ADMIN — DIVALPROEX SODIUM 500 MG: 500 TABLET, DELAYED RELEASE ORAL at 20:25

## 2024-02-21 ASSESSMENT — PAIN DESCRIPTION - DESCRIPTORS
DESCRIPTORS: ACHING
DESCRIPTORS: ACHING

## 2024-02-21 ASSESSMENT — PAIN DESCRIPTION - LOCATION
LOCATION: HEAD
LOCATION: HEAD

## 2024-02-21 ASSESSMENT — PAIN SCALES - GENERAL
PAINLEVEL_OUTOF10: 7
PAINLEVEL_OUTOF10: 3
PAINLEVEL_OUTOF10: 6
PAINLEVEL_OUTOF10: 0

## 2024-02-21 ASSESSMENT — ENCOUNTER SYMPTOMS
CONSTIPATION: 0
COUGH: 0
ABDOMINAL DISTENTION: 0
STRIDOR: 0
WHEEZING: 0
VOMITING: 0
ABDOMINAL PAIN: 0
NAUSEA: 0
SHORTNESS OF BREATH: 0
DIARRHEA: 0

## 2024-02-21 NOTE — ED NOTES
ED to inpatient nurses report      Chief Complaint:  Chief Complaint   Patient presents with    Headache    Seizures     PTA; unwitnessed     Present to ED from: home     MOA:     LOC: alert and orientated to name, place, date  Mobility: Independent  Oxygen Baseline: RA    Current needs required: RA   Pending ED orders: one  Present condition: admitted neuro patient     Why did the patient come to the ED? Pt presents to the ED with c/o of migraine and possible seizure.   Pt states he has a seizure disorder and was following with neurology but has not seen them in about 5 years. Pt states he was previously taking dilantin but neurology took patient off.   Pt states he thinks he had a seizure around 0930 this morning, states he believes he bit his tongue d/t pain.  Episode was unwitnessed, while patient was in bed, patient denies urinary incontinence.  Pt states he has been having a headache for approx 1 week.   Pt states he has been taking tylenol without relief.   Pt states he sometimes switches between two different pairs of glasses.   Pt denies other complaints.   Pt denies any daily medications.   Pt place on full cardiac monitor,  Call light in reach, white board updated.    What is the plan? Admission to neuro for EEG   Any procedures or intervention occur? Labs, ct head w/o contast (negative), IV keppra, EKG, migrane cocktail   Any safety concerns?? Seizure precautions     Mental Status:       Psych Assessment:      Vital signs   Vitals:    02/20/24 2300 02/20/24 2303 02/20/24 2306 02/20/24 2309   BP: (!) 134/92      Pulse: 62 67 70 72   Resp: 21 22 22 21   Temp:       TempSrc:       SpO2: 94% 94% 94% 95%   Weight:            Vitals:  Patient Vitals for the past 24 hrs:   BP Temp Temp src Pulse Resp SpO2 Weight   02/20/24 2309 -- -- -- 72 21 95 % --   02/20/24 2306 -- -- -- 70 22 94 % --   02/20/24 2303 -- -- -- 67 22 94 % --   02/20/24 2300 (!) 134/92 -- -- 62 21 94 % --   02/20/24 2230 (!) 136/96 -- -- 70 20 
General neurology resident perfect served regarding clarification of blood tox screen orders. Awaiting response.   
Pt given box lunch  
Pt presents to the ED with c/o of migraine and possible seizure.   Pt states he has a seizure disorder and was following with neurology but has not seen them in about 5 years. Pt states he was previously taking dilantin but neurology took patient off.   Pt states he thinks he had a seizure around 0930 this morning, states he believes he bit his tongue d/t pain.  Episode was unwitnessed, while patient was in bed, patient denies urinary incontinence.  Pt states he has been having a headache for approx 1 week.   Pt states he has been taking tylenol without relief.   Pt states he sometimes switches between two different pairs of glasses.   Pt denies other complaints.   Pt denies any daily medications.   Pt place on full cardiac monitor,  Call light in reach, white board updated.    
Pt to MRI  
Pt transported to floor in wheelchair by tech    
The following labs labeled with pt sticker and tubed to lab:     [] Blue     [] Lavender   [] on ice  [] Green/yellow  [] Green/black [] on ice  [] Yellow  [] Red  [] Pink      [] COVID-19 swab    [] Rapid  [] PCR  [] Flu Swab  [] Strep Swab  [] Peds Viral Panel     [x] Urine Sample  [] Pelvic Cultures  [] Blood Cultures   [] Wound Cultures     
The following labs labeled with pt sticker and tubed to lab:     [x] Blue     [x] Lavender   [] on ice  [x] Green/yellow  [] Green/black [] on ice  [] Yellow  [x] Red  [] Pink      [] COVID-19 swab    [] Rapid  [] PCR  [] Flu Swab  [] Strep Swab  [] Peds Viral Panel     [] Urine Sample  [] Pelvic Cultures  [] Blood Cultures   [] Wound Cultures     
allergies.    CURRENT MEDICATIONS       Previous Medications    ACETAMINOPHEN (TYLENOL) 500 MG TABLET    Take 2 tablets by mouth every 6 hours as needed for Pain    IBUPROFEN (ADVIL;MOTRIN) 600 MG TABLET    Take 1 tablet by mouth every 6 hours as needed for Pain    METHOCARBAMOL (ROBAXIN-750) 750 MG TABLET    Take 1 tablet by mouth 3 times daily   WARNING:  May cause drowsiness.  May impair ability to operate vehicles or machinery.  Do not use in combination with alcohol.    RIZATRIPTAN (MAXALT-MLT) 5 MG DISINTEGRATING TABLET    Take 1 tablet by mouth once as needed for Migraine May repeat in 2 hours if needed    VITAMIN B-2 (RIBOFLAVIN) 100 MG TABS TABLET    Take 1 tablet by mouth daily     Orders Placed This Encounter   Medications    lactated ringers bolus 1,000 mL    metoclopramide (REGLAN) injection 10 mg    diphenhydrAMINE (BENADRYL) injection 25 mg    levETIRAcetam (KEPPRA) 1000 mg/100 mL IVPB    sodium chloride flush 0.9 % injection 5-40 mL    sodium chloride flush 0.9 % injection 5-40 mL    0.9 % sodium chloride infusion    OR Linked Order Group     potassium chloride (KLOR-CON M) extended release tablet 40 mEq     potassium bicarb-citric acid (EFFER-K) effervescent tablet 40 mEq     potassium chloride 10 mEq/100 mL IVPB (Peripheral Line)    magnesium sulfate 2000 mg in 50 mL IVPB premix    LORazepam (ATIVAN) injection 4 mg    enoxaparin (LOVENOX) injection 40 mg     Order Specific Question:   Indication of Use     Answer:   Prophylaxis-DVT/PE    OR Linked Order Group     ondansetron (ZOFRAN-ODT) disintegrating tablet 4 mg     ondansetron (ZOFRAN) injection 4 mg    polyethylene glycol (GLYCOLAX) packet 17 g    OR Linked Order Group     acetaminophen (TYLENOL) tablet 650 mg     acetaminophen (TYLENOL) suppository 650 mg    levETIRAcetam (KEPPRA) tablet 500 mg       SURGICAL HISTORY     History reviewed. No pertinent surgical history.    PAST MEDICAL HISTORY       Past Medical History:   Diagnosis Date

## 2024-02-21 NOTE — ED PROVIDER NOTES
[facility] ED  Emergency Department  Emergency Medicine Resident Turn-Over     Note Started: 9:42 PM EST    Care of Eugene Ross  was assumed from Dr. Perez and is being seen for Headache and Seizures (PTA; unwitnessed)  .  The patient's initial evaluation and plan have been discussed with the prior provider who initially evaluated the patient.     EMERGENCY DEPARTMENT COURSE / MEDICAL DECISION MAKING:       MEDICATIONS GIVEN:  Orders Placed This Encounter   Medications    lactated ringers bolus 1,000 mL    metoclopramide (REGLAN) injection 10 mg    diphenhydrAMINE (BENADRYL) injection 25 mg    levETIRAcetam (KEPPRA) 1000 mg/100 mL IVPB    sodium chloride flush 0.9 % injection 5-40 mL    sodium chloride flush 0.9 % injection 5-40 mL    0.9 % sodium chloride infusion    OR Linked Order Group     potassium chloride (KLOR-CON M) extended release tablet 40 mEq     potassium bicarb-citric acid (EFFER-K) effervescent tablet 40 mEq     potassium chloride 10 mEq/100 mL IVPB (Peripheral Line)    magnesium sulfate 2000 mg in 50 mL IVPB premix    LORazepam (ATIVAN) injection 4 mg    enoxaparin (LOVENOX) injection 40 mg     Order Specific Question:   Indication of Use     Answer:   Prophylaxis-DVT/PE    OR Linked Order Group     ondansetron (ZOFRAN-ODT) disintegrating tablet 4 mg     ondansetron (ZOFRAN) injection 4 mg    polyethylene glycol (GLYCOLAX) packet 17 g    OR Linked Order Group     acetaminophen (TYLENOL) tablet 650 mg     acetaminophen (TYLENOL) suppository 650 mg    levETIRAcetam (KEPPRA) tablet 500 mg       LABS / RADIOLOGY:     Labs Reviewed   COMPREHENSIVE METABOLIC PANEL - Abnormal; Notable for the following components:       Result Value    Glucose 106 (*)     Total Bilirubin 0.2 (*)     All other components within normal limits   CBC WITH AUTO DIFFERENTIAL - Abnormal; Notable for the following components:    Lymphocytes % 47 (*)     All other components within normal limits 
     [facility] ED  Emergency Department Encounter  Emergency Medicine Resident     Pt Name:Eugene Ross Jr.  MRN: [id number]  Birthdate 1970  Date of evaluation: 2/20/24  PCP:  No primary care provider on file.  Note Started: 11:03 PM EST      CHIEF COMPLAINT       Chief Complaint   Patient presents with    Headache    Seizures     PTA; unwitnessed       HISTORY OF PRESENT ILLNESS  (Location/Symptom, Timing/Onset, Context/Setting, Quality, Duration, Modifying Factors, Severity.)      Eugene Ross Jr. is a 53 y.o. male who presents with seizures and headache.  Patient reports that he has been having headache consistently for the past week.  He reports he takes Tylenol but does not significantly improve the headache.  He reports that he has also been having more frequent breakthrough seizures for the past few months.  He additionally reports that he had a seizure today and he believes he may have bit his tongue.  Patient reports his seizures are normally tonic-clonic in nature.  He does not believe he injured himself when he had a seizure earlier but is unsure if he did have a seizure or not.  He denies any weakness in his arms or legs, loss of sensation or significant vision changes.  He denies nausea or vomiting fever or chills or neck pain.  He reports he is no longer taking any medication for his seizure disorder and has not in several years.  Per report patient used to take Dilantin.    PAST MEDICAL / SURGICAL / SOCIAL / FAMILY HISTORY      has a past medical history of Acute kidney injury (HCC), Allergic rhinitis, Chronic back pain, Hyperlipidemia, Seizures (HCC), and Urinary incontinence.     has no past surgical history on file.    Social History     Socioeconomic History    Marital status: Single     Spouse name: Not on file    Number of children: Not on file    Years of education: Not on file    Highest education level: Not on file   Occupational History    Not on file   Tobacco Use    
Faculty Sign-Out Attestation  Handoff taken on the following patient from prior Attending Physician: Bo  Note Started: 11:44 PM EST    I was available and discussed any additional care issues that arose and coordinated the management plans with the resident(s) caring for the patient during my duty period. Any areas of disagreement with resident’s documentation of care or procedures are noted on the chart. I was personally present for the key portions of any/all procedures during my duty period. I have documented in the chart those procedures where I was not present during the key portions.    Seizure, off dilantin, loading keppra, ct-,   Neuro consult / will give direction    Neuro admitting,     Hilton Merlos DO  Attending Physician       Hilton Merlos DO  02/20/24 2344       Hilton Merlos DO  02/21/24 0009    
alcohol use.  No significant caffeine use to trigger migraines.  No stiff neck or strokelike symptoms.  He had a seizure 2 days ago and another 1 today.  He has a typical migraine type headache at this time.  He did bite his tongue.  No urinary incontinence.  On exam he is GCS 15, slightly per tensive other vital signs are normal.  Normal speech mentation memory pupils motor strength.  There is no bleeding from his tongue abrasions.  Impression is seizure disorder.  Plan is IV Keppra, migraine cocktail, discussion with neurology, dissipate discharge with follow-up.            Marty Soriano MD, FACEP  Attending Emergency  Physician                Marty Soriano MD  02/20/24 2021

## 2024-02-22 VITALS
TEMPERATURE: 98.2 F | DIASTOLIC BLOOD PRESSURE: 81 MMHG | RESPIRATION RATE: 19 BRPM | OXYGEN SATURATION: 93 % | WEIGHT: 188 LBS | HEART RATE: 88 BPM | HEIGHT: 67 IN | BODY MASS INDEX: 29.51 KG/M2 | SYSTOLIC BLOOD PRESSURE: 93 MMHG

## 2024-02-22 PROCEDURE — 2580000003 HC RX 258

## 2024-02-22 PROCEDURE — 97165 OT EVAL LOW COMPLEX 30 MIN: CPT

## 2024-02-22 PROCEDURE — 36415 COLL VENOUS BLD VENIPUNCTURE: CPT

## 2024-02-22 PROCEDURE — 99232 SBSQ HOSP IP/OBS MODERATE 35: CPT | Performed by: PSYCHIATRY & NEUROLOGY

## 2024-02-22 PROCEDURE — 6370000000 HC RX 637 (ALT 250 FOR IP): Performed by: STUDENT IN AN ORGANIZED HEALTH CARE EDUCATION/TRAINING PROGRAM

## 2024-02-22 PROCEDURE — 80307 DRUG TEST PRSMV CHEM ANLYZR: CPT

## 2024-02-22 PROCEDURE — 97161 PT EVAL LOW COMPLEX 20 MIN: CPT

## 2024-02-22 PROCEDURE — 97535 SELF CARE MNGMENT TRAINING: CPT

## 2024-02-22 RX ORDER — DIVALPROEX SODIUM 500 MG/1
500 TABLET, EXTENDED RELEASE ORAL DAILY
Qty: 30 TABLET | Refills: 3 | Status: SHIPPED | OUTPATIENT
Start: 2024-02-22

## 2024-02-22 RX ADMIN — SODIUM CHLORIDE, PRESERVATIVE FREE 10 ML: 5 INJECTION INTRAVENOUS at 09:06

## 2024-02-22 RX ADMIN — DIVALPROEX SODIUM 500 MG: 500 TABLET, DELAYED RELEASE ORAL at 09:06
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
## 2024-02-22 NOTE — PROGRESS NOTES
Physical Therapy  Facility/Department: [address] STE[address]OWN  Physical Therapy Initial Assessment    Name: Eugene Ross Jr.  : 1970  MRN: [id number]  Date of Service: 2024    Discharge Recommendations:  No therapy recommended at discharge   PT Equipment Recommendations  Equipment Needed: No    53 year old male with past medical history headache, seizure disorder, who presents to the ED with seizure like activity and headache. The patient  states he woke up around 10 am with body ache, tongue bite, and headache and suspected he had a seizure. He denies urinary/stool incontinence. Loaded Keppra in the ED. He states his last seizure might have been a few days ago in his sleep, although he cannot be sure. His headache is on both sides of the head, present for past 1 week       Patient Diagnosis(es): The primary encounter diagnosis was Acute nonintractable headache, unspecified headache type. Diagnoses of Seizure (HCC) and Seizures (HCC) were also pertinent to this visit.  Past Medical History:  has a past medical history of Acute kidney injury (HCC), Allergic rhinitis, Chronic back pain, Hyperlipidemia, Seizures (HCC), and Urinary incontinence.  Past Surgical History:  has no past surgical history on file.    Assessment   Assessment: Pt able to ambulate 400 ft without device and IND, no loss of balance. Pt negotiate 12 stairs with SUP, 1 rail. Pt indep for transfers without device. Pt at baseline for mobility and gait, no further therapy needs at this time.  Therapy Prognosis: Good  Requires PT Follow-Up: No  Activity Tolerance  Activity Tolerance: Patient tolerated evaluation without incident     Plan   Physical Therapy Plan  General Plan: Discharge with evaluation only  Safety Devices  Type of Devices: Call light within reach, Chair alarm in place, Gait belt, Nurse notified, Left in chair, Telesitter in use  Restraints  Restraints Initially in Place: No

## 2024-02-22 NOTE — PROGRESS NOTES
Occupational Therapy  [facility] [address] [facility]  [facility] Therapy Initial Assessment    Name: Eugene Ross Jr.  : 1970  MRN: [id number]  Date of Service: 2024    Chief Complaint   Patient presents with    Headache    Seizures     PTA; unwitnessed       Discharge Recommendations:   No therapy recommended at discharge.  OT Equipment Recommendations  Equipment Needed: No       Patient Diagnosis(es): The primary encounter diagnosis was Acute nonintractable headache, unspecified headache type. Diagnoses of Seizure (HCC) and Seizures (HCC) were also pertinent to this visit.  Past Medical History:  has a past medical history of Acute kidney injury (HCC), Allergic rhinitis, Chronic back pain, Hyperlipidemia, Seizures (HCC), and Urinary incontinence.  Past Surgical History:  has no past surgical history on file.           Assessment   Assessment: Pt agreed to OT eval this date. Pt currently demonstrates no acute performance deficits and is completing all ADLs/IADLs and functional mobility independently. Pt will be discharged from OT services as there are no goals identified and no needs to address. Pt reports no concerns with returning to prior living arrangements and completing all ADLs/IADLs independently upon discharge. Please reorder OT if future needs arise.  Prognosis: Good  Decision Making: Low Complexity  No Skilled OT: Independent with ADL's;No OT goals identified  REQUIRES OT FOLLOW-UP: No  Activity Tolerance  Activity Tolerance: Patient Tolerated treatment well        Restrictions  Restrictions/Precautions  Restrictions/Precautions: Up as Tolerated, Seizure  Required Braces or Orthoses?: No    Subjective   General  Patient assessed for rehabilitation services?: Yes  Family / Caregiver Present: No  General Comment  Comments: RN ok'd pt for OT this date. Pt agreed to session and was pleasant/cooperative throughout. Pt denies pain    Social/Functional History  Social/Functional  Cyclophosphamide Pregnancy And Lactation Text: This medication is Pregnancy Category D and it isn't considered safe during pregnancy. This medication is excreted in breast milk.
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
## 2024-02-22 NOTE — DISCHARGE INSTRUCTIONS
No driving for now at least until seen by neurology in clinic. Decision will bemade on duration, given those are nocturnal seizures.   No bathing or swimming unsupervised  Avoid locking doors, it prevents some to help you if needed  Avoid stairs unsupervised  Avoid cooking unsupervised

## 2024-02-22 NOTE — CARE COORDINATION
Discharge Report    [facility]  Clinical Case Management Department  Written by: Batsheva Lucio RN    Patient Name: Eugene Ross .  Attending Provider: Vijay Ruff MD  Admit Date: 2024  7:42 PM  MRN: [id number]  Account: [id number]                     : 1970  Discharge Date: 24      Disposition: home    Batsheva Lucio RN    
Yes  Other Identified Issues/Barriers to RETURNING to current housing: na  Potential Assistance needed at discharge: (P) N/A            Potential DME:    Patient expects to discharge to: (P) Apartment  Plan for transportation at discharge: (P) Family    Financial    Payor: [employer] [place] / Plan: [employer] PLAN [place] / Product Type: *No Product type* /     Does insurance require precert for SNF: Yes    Potential assistance Purchasing Medications: (P) No  Meds-to-Beds request: Yes      [id number] - [place], [place] - [address] - P [phone] - F [phone]  [address] [facility] [address] [address]  Phone: [phone] Fax: [phone]      Notes:    Factors facilitating achievement of predicted outcomes: Family support, Cooperative, Pleasant, and Sense of humor    Barriers to discharge: Medical complications    Additional Case Management Notes: Spoke with patient, role explained. Plan to return home with dtr, has transportation, denies further needs. Address and insurance reviewed.  PCP list provided.     The Plan for Transition of Care is related to the following treatment goals of Seizure (HCC) [R56.9]  Seizure-like activity (HCC) [R56.9]  Acute nonintractable headache, unspecified headache type [R51.9]    IF APPLICABLE: The Patient and/or patient representative Eugene and his family were provided with a choice of provider and agrees with the discharge plan. Freedom of choice list with basic dialogue that supports the patient's individualized plan of care/goals and shares the quality data associated with the providers was provided to: (P) Patient   Patient Representative Name:       The Patient and/or Patient Representative Agree with the Discharge Plan? (P) Yes    Batsheva Lucio RN  Case Management Department  Ph: [phone]

## 2024-02-23 LAB
AMPHETAMINE: NEGATIVE NG/ML
BARBITURATES: NEGATIVE NG/ML
BENZODIAZEPINES: NEGATIVE NG/ML
BUPRENORPHINE: NEGATIVE NG/ML
COCAINE: NEGATIVE NG/ML
DRUGS OF ABUSE COMMENT: NORMAL
EKG ATRIAL RATE: 92 BPM
EKG P AXIS: 42 DEGREES
EKG P-R INTERVAL: 168 MS
EKG Q-T INTERVAL: 330 MS
EKG QRS DURATION: 74 MS
EKG QTC CALCULATION (BAZETT): 408 MS
EKG R AXIS: -17 DEGREES
EKG T AXIS: 26 DEGREES
EKG VENTRICULAR RATE: 92 BPM
METHADONE: POSITIVE NG/ML
METHAMPHETAMINE: NEGATIVE NG/ML
OPIATES: NEGATIVE NG/ML
OXYCODONE: NEGATIVE NG/ML
PHENCYCLIDINE: NEGATIVE NG/ML
THC: NEGATIVE NG/ML

## 2024-03-01 LAB
EDDP, EDDP: <10 NG/ML
METHADONE: <10 NG/ML

## 2024-03-08 ENCOUNTER — HOSPITAL ENCOUNTER (OUTPATIENT)
Dept: NEUROLOGY | Age: 54
Discharge: HOME | End: 2024-03-08
Payer: MEDICAID

## 2024-03-08 DIAGNOSIS — R56.9 SEIZURES (HCC): ICD-10-CM

## 2024-03-08 PROCEDURE — 95816 EEG AWAKE AND DROWSY: CPT

## 2024-03-08 NOTE — PROCEDURES
EEG REPORT       Patient: Eugene Ross Jr. Age: 53 y.o.  MRN: [id number]      Referring Provider: Gopi Knapp    History: This routine 30 minute scalp EEG was recorded with video- monitoring for a 53 y.o.. male  who presented with encephalopathy. This EEG was performed to evaluate for focal and epileptiform abnormalities.     Eugene Ross Jr.   Current Outpatient Medications   Medication Sig Dispense Refill    divalproex (DEPAKOTE ER) 500 MG extended release tablet Take 1 tablet by mouth daily 30 tablet 3    acetaminophen (TYLENOL) 500 MG tablet Take 2 tablets by mouth every 6 hours as needed for Pain (Patient taking differently: Take 2 tablets by mouth every 6 hours as needed for Pain Indications: Headache, Pain) 42 tablet 0     No current facility-administered medications for this encounter.        Technical Description: This is a 21 channel digital EEG recording with time-locked video. Electrodes were placed in accordance with the 10-20 International System of Electrode Placement. Single lead EKG monitoring as well as temporal electrodes were included.    The patient was not sleep deprived. This recording was obtained during wakefulness.  EEG Description: The dominant background activity during maximal recorded wakefulness consisted of bioccipitally dominant 9-10 Hz, 25-35 uV symmetric, regular activity that was reactive to eye opening. During drowsiness, the background rhythm waxed and waned and there were periods of slowing. Deeper sleep was not seen.    Photic stimulation - stepwise photic stimulation at 2-30 Hz was performed and there was a biposterior, symmetric, driving response.  Hyperventilation - did not produce any abnormality.     No abnormalities were activated by photic stimulation     The EKG channel demonstrated a normal sinus rhythm.    Interpretation  This EEG was normal in wakefulness and sleep.     Clinical correlation  This EEG was normal. No focal or epileptiform abnormalities were

## 2024-07-15 ENCOUNTER — HOSPITAL ENCOUNTER (OUTPATIENT)
Age: 54
Setting detail: SPECIMEN
Discharge: HOME | End: 2024-07-15

## 2024-07-15 ENCOUNTER — OFFICE VISIT (OUTPATIENT)
Dept: INTERNAL MEDICINE | Age: 54
End: 2024-07-15
Payer: MEDICAID

## 2024-07-15 VITALS
HEIGHT: 67 IN | OXYGEN SATURATION: 98 % | DIASTOLIC BLOOD PRESSURE: 78 MMHG | TEMPERATURE: 98 F | SYSTOLIC BLOOD PRESSURE: 136 MMHG | BODY MASS INDEX: 29.82 KG/M2 | RESPIRATION RATE: 16 BRPM | HEART RATE: 77 BPM | WEIGHT: 190 LBS

## 2024-07-15 DIAGNOSIS — Z13.9 SCREENING DUE: ICD-10-CM

## 2024-07-15 DIAGNOSIS — Z11.3 SCREEN FOR STD (SEXUALLY TRANSMITTED DISEASE): ICD-10-CM

## 2024-07-15 DIAGNOSIS — E78.5 HYPERLIPIDEMIA, UNSPECIFIED HYPERLIPIDEMIA TYPE: ICD-10-CM

## 2024-07-15 DIAGNOSIS — Z00.00 ENCOUNTER FOR MEDICAL EXAMINATION TO ESTABLISH CARE: Primary | ICD-10-CM

## 2024-07-15 DIAGNOSIS — R56.9 SEIZURES (HCC): ICD-10-CM

## 2024-07-15 PROCEDURE — G8419 CALC BMI OUT NRM PARAM NOF/U: HCPCS

## 2024-07-15 PROCEDURE — 90746 HEPB VACCINE 3 DOSE ADULT IM: CPT | Performed by: STUDENT IN AN ORGANIZED HEALTH CARE EDUCATION/TRAINING PROGRAM

## 2024-07-15 PROCEDURE — G8427 DOCREV CUR MEDS BY ELIG CLIN: HCPCS

## 2024-07-15 PROCEDURE — 1036F TOBACCO NON-USER: CPT

## 2024-07-15 PROCEDURE — 3017F COLORECTAL CA SCREEN DOC REV: CPT

## 2024-07-15 PROCEDURE — 99203 OFFICE O/P NEW LOW 30 MIN: CPT

## 2024-07-15 SDOH — ECONOMIC STABILITY: INCOME INSECURITY: HOW HARD IS IT FOR YOU TO PAY FOR THE VERY BASICS LIKE FOOD, HOUSING, MEDICAL CARE, AND HEATING?: NOT VERY HARD

## 2024-07-15 SDOH — ECONOMIC STABILITY: HOUSING INSECURITY
IN THE LAST 12 MONTHS, WAS THERE A TIME WHEN YOU DID NOT HAVE A STEADY PLACE TO SLEEP OR SLEPT IN A SHELTER (INCLUDING NOW)?: NO

## 2024-07-15 SDOH — ECONOMIC STABILITY: FOOD INSECURITY: WITHIN THE PAST 12 MONTHS, THE FOOD YOU BOUGHT JUST DIDN'T LAST AND YOU DIDN'T HAVE MONEY TO GET MORE.: NEVER TRUE

## 2024-07-15 SDOH — ECONOMIC STABILITY: FOOD INSECURITY: WITHIN THE PAST 12 MONTHS, YOU WORRIED THAT YOUR FOOD WOULD RUN OUT BEFORE YOU GOT MONEY TO BUY MORE.: NEVER TRUE

## 2024-07-15 ASSESSMENT — PATIENT HEALTH QUESTIONNAIRE - PHQ9
6. FEELING BAD ABOUT YOURSELF - OR THAT YOU ARE A FAILURE OR HAVE LET YOURSELF OR YOUR FAMILY DOWN: NOT AT ALL
9. THOUGHTS THAT YOU WOULD BE BETTER OFF DEAD, OR OF HURTING YOURSELF: NOT AT ALL
2. FEELING DOWN, DEPRESSED OR HOPELESS: NOT AT ALL
SUM OF ALL RESPONSES TO PHQ QUESTIONS 1-9: 2
10. IF YOU CHECKED OFF ANY PROBLEMS, HOW DIFFICULT HAVE THESE PROBLEMS MADE IT FOR YOU TO DO YOUR WORK, TAKE CARE OF THINGS AT HOME, OR GET ALONG WITH OTHER PEOPLE: NOT DIFFICULT AT ALL
SUM OF ALL RESPONSES TO PHQ QUESTIONS 1-9: 2
5. POOR APPETITE OR OVEREATING: NOT AT ALL
SUM OF ALL RESPONSES TO PHQ9 QUESTIONS 1 & 2: 0
SUM OF ALL RESPONSES TO PHQ QUESTIONS 1-9: 2
SUM OF ALL RESPONSES TO PHQ QUESTIONS 1-9: 2
8. MOVING OR SPEAKING SO SLOWLY THAT OTHER PEOPLE COULD HAVE NOTICED. OR THE OPPOSITE, BEING SO FIGETY OR RESTLESS THAT YOU HAVE BEEN MOVING AROUND A LOT MORE THAN USUAL: NOT AT ALL
1. LITTLE INTEREST OR PLEASURE IN DOING THINGS: NOT AT ALL
3. TROUBLE FALLING OR STAYING ASLEEP: SEVERAL DAYS
4. FEELING TIRED OR HAVING LITTLE ENERGY: SEVERAL DAYS
7. TROUBLE CONCENTRATING ON THINGS, SUCH AS READING THE NEWSPAPER OR WATCHING TELEVISION: NOT AT ALL

## 2024-07-15 ASSESSMENT — ENCOUNTER SYMPTOMS
EYES NEGATIVE: 1
RESPIRATORY NEGATIVE: 1
GASTROINTESTINAL NEGATIVE: 1
ALLERGIC/IMMUNOLOGIC NEGATIVE: 1

## 2024-07-15 NOTE — PROGRESS NOTES
Attending Physician Statement  I have discussed the care of Eugene NILSON Ross Jr., including pertinent history and exam findings with the resident. I have reviewed the key elements of all parts of the encounter with the resident.  I agree with the assessment, and status of the problem list as documented. The plan and orders should include   Orders Placed This Encounter   Procedures    Chlamydia/GC DNA, Urine    Hep B, ENGERIX-B, (age 20 yrs+), IM, 1mL, 3-dose    Lipid, Fasting    Hemoglobin A1C    HIV Screen    T. Pallidum Ab    Hepatitis C Antibody    [facility] Screening Colonoscopy    and this was also documented by the resident. The medication list was reviewed with the resident and is up to date.    Diagnosis Orders   1. Encounter for medical examination to establish care        2. Screening due  Hemoglobin A1C    [facility] Screening Colonoscopy    Hep B, ENGERIX-B, (age 20 yrs+), IM, 1mL, 3-dose      3. Hyperlipidemia, unspecified hyperlipidemia type  Lipid, Fasting      4. Screen for STD (sexually transmitted disease)  HIV Screen    Chlamydia/GC DNA, Urine    T. Pallidum Ab    Hepatitis C Antibody      5. Seizures (HCC)             Makeda Zuniga MD   Attending Physician, [facility]   Faculty, Internal Medicine Residency Program  [facility]     
    ______________________________________________________________________     Diagnosis Orders   1. Screening due  Hemoglobin A1C    [facility] Screening Colonoscopy    Hep B, ENGERIX-B, (age 20 yrs+), IM, 1mL, 3-dose      2. Hyperlipidemia, unspecified hyperlipidemia type  Lipid, Fasting      3. Encounter for medical examination to establish care        4. Screen for STD (sexually transmitted disease)  HIV Screen    Chlamydia/GC DNA, Urine    T. Pallidum Ab    Hepatitis C Antibody             Assessment and Plan:  Eugene was seen today for established new doctor.    Diagnoses and all orders for this visit:    Screening due  -     Hemoglobin A1C; Future  -     [facility] Screening Colonoscopy  -     Hep B, ENGERIX-B, (age 20 yrs+), IM, 1mL, 3-dose    Hyperlipidemia, unspecified hyperlipidemia type  -     Lipid, Fasting; Future    Encounter for medical examination to establish care    Screen for STD (sexually transmitted disease)  -     HIV Screen; Future  -     Chlamydia/GC DNA, Urine; Future  -     T. Pallidum Ab; Future  -     Hepatitis C Antibody; Future        ______________________________________________________________________  Follow up and instructions:  No follow-ups on file.  Follow-up 6 months  Eugene received counseling on the following healthy behaviors: nutrition, exercise, and medication adherence    Discussed use, benefit, and side effects of prescribed medications.  Barriers to medication compliance addressed.  All patient questions answered.  Pt voiced understanding.     Patient given educational materials - see patient instructions    Shavon Stoddard MD   Internal Medicine  7/15/2024, 3:53 PM    This note is created with the assistance of a speech-recognition program. While intending to generate a document that actually reflects the content of the visit, the document can still have some mistakes which may not have been identified and corrected by editing.

## 2024-07-16 ENCOUNTER — OFFICE VISIT (OUTPATIENT)
Dept: NEUROLOGY | Age: 54
End: 2024-07-16
Payer: MEDICAID

## 2024-07-16 ENCOUNTER — HOSPITAL ENCOUNTER (OUTPATIENT)
Age: 54
Setting detail: SPECIMEN
Discharge: HOME | End: 2024-07-16

## 2024-07-16 VITALS
WEIGHT: 186.6 LBS | SYSTOLIC BLOOD PRESSURE: 158 MMHG | HEART RATE: 93 BPM | HEIGHT: 67 IN | BODY MASS INDEX: 29.29 KG/M2 | DIASTOLIC BLOOD PRESSURE: 94 MMHG

## 2024-07-16 DIAGNOSIS — G40.909 SEIZURE DISORDER (HCC): Primary | ICD-10-CM

## 2024-07-16 DIAGNOSIS — Z11.3 SCREEN FOR STD (SEXUALLY TRANSMITTED DISEASE): ICD-10-CM

## 2024-07-16 DIAGNOSIS — G44.219 EPISODIC TENSION-TYPE HEADACHE, NOT INTRACTABLE: ICD-10-CM

## 2024-07-16 LAB
CHOLEST SERPL-MCNC: 262 MG/DL (ref 0–199)
CHOLESTEROL/HDL RATIO: 5
HCV AB SERPL QL IA: NONREACTIVE
HDLC SERPL-MCNC: 58 MG/DL
HIV 1+2 AB+HIV1 P24 AG SERPL QL IA: NONREACTIVE
LDLC SERPL CALC-MCNC: 184 MG/DL (ref 0–100)
T PALLIDUM AB SER QL IA: NONREACTIVE
TRIGL SERPL-MCNC: 103 MG/DL (ref 0–149)
VLDLC SERPL CALC-MCNC: 21 MG/DL

## 2024-07-16 PROCEDURE — 1036F TOBACCO NON-USER: CPT

## 2024-07-16 PROCEDURE — G8427 DOCREV CUR MEDS BY ELIG CLIN: HCPCS

## 2024-07-16 PROCEDURE — G8419 CALC BMI OUT NRM PARAM NOF/U: HCPCS

## 2024-07-16 PROCEDURE — 3017F COLORECTAL CA SCREEN DOC REV: CPT

## 2024-07-16 PROCEDURE — 99214 OFFICE O/P EST MOD 30 MIN: CPT

## 2024-07-16 RX ORDER — ACETAMINOPHEN 500 MG
1000 TABLET ORAL EVERY 6 HOURS PRN
Qty: 42 TABLET | Refills: 0 | Status: SHIPPED | OUTPATIENT
Start: 2024-07-16 | End: 2024-07-21

## 2024-07-16 RX ORDER — DIVALPROEX SODIUM 500 MG/1
500 TABLET, EXTENDED RELEASE ORAL DAILY
Qty: 30 TABLET | Refills: 3 | Status: SHIPPED | OUTPATIENT
Start: 2024-07-16

## 2024-07-16 ASSESSMENT — ENCOUNTER SYMPTOMS
NAUSEA: 0
VOMITING: 0
CHEST TIGHTNESS: 0
COLOR CHANGE: 0
VOICE CHANGE: 0
SHORTNESS OF BREATH: 0
WHEEZING: 0
TROUBLE SWALLOWING: 0
PHOTOPHOBIA: 0

## 2024-07-16 NOTE — PROGRESS NOTES
Attending Physician Statement  I have discussed the case of uEgene Ross  including pertinent history and exam findings with the resident physician. I reviewed medications, clinical labs, x-rays and other diagnostic tests with the resident physician.    I have seen and examined the patient and the key elements of the encounter have been performed by me. I agree with the assessment, plan and orders as documented by the resident physician.          This note was partially created using voice recognition software and is inherently subject to errors including those of syntax and \"sound alike\" substitutions which may escape proofreading.  In such instances, original meaning may be extrapolated by contextual derivation.  Daksha Felipe MD 7/16/2024 1:42 PM     
midline tongue without atrophy or fasciculation     Motor function  Normal muscle bulk and tone  Muscle strength: normal power 5/5  fine motor movements     Sensory function Intact to touch, pin prick, vibration, proprioception in bilateral upper and lower extremities.      Cerebellar Intact fine motor movement. No involuntary movements or tremors     Reflex function Intact 2+ DTR and symmetric. Negative Babinski     Gait                  Normal station and gait           PRIOR TESTS AND IMAGING: Following images and Labs were reviewed by the examiner             ASSESSMENT       Seizures  Suspect Tension Headaches      PLAN:     Continue Depakote 500 mg qHS  Obtain Depakote Levels  CMP    Follow up in the clinic in 6 months.   Instructed patient to call the clinic if symptoms worsen or develop any new symptoms.     Case discussed with attending Dr. Felipe.    Mr. Ross received counseling on the following healthy behaviors: medical compliance, smoking cessation, blood pressure control, regular follow up with primary doctor.      I have spent 60 minutes face to face with the patient more than 50% of this time was spent counseling and coordinating care.      Electronically signed by Raul Cheney DO on 7/16/2024 at 1:22 PM

## 2024-07-17 LAB
CHLAMYDIA DNA UR QL NAA+PROBE: NEGATIVE
EST. AVERAGE GLUCOSE BLD GHB EST-MCNC: 128 MG/DL
HBA1C MFR BLD: 6.1 % (ref 4–6)
N GONORRHOEA DNA UR QL NAA+PROBE: NEGATIVE
SPECIMEN DESCRIPTION: NORMAL

## 2024-07-19 DIAGNOSIS — E78.49 OTHER HYPERLIPIDEMIA: Primary | ICD-10-CM

## 2024-07-19 RX ORDER — ATORVASTATIN CALCIUM 40 MG/1
20 TABLET, FILM COATED ORAL DAILY
Qty: 30 TABLET | Refills: 1 | Status: SHIPPED | OUTPATIENT
Start: 2024-07-19

## 2024-07-25 RX ORDER — IBUPROFEN 600 MG/1
600 TABLET ORAL EVERY 6 HOURS PRN
Qty: 40 TABLET | Refills: 0 | Status: SHIPPED | OUTPATIENT
Start: 2024-07-25

## 2024-07-25 NOTE — TELEPHONE ENCOUNTER
..Request for   Requested Prescriptions     Pending Prescriptions Disp Refills    ibuprofen (ADVIL;MOTRIN) 600 MG tablet 40 tablet 0     Sig: Take 1 tablet by mouth every 6 hours as needed for Pain    .      Please review and e-scribe to pharmacy listed in chart if appropriate. Thank you.      Last Visit Date: 7/15/2024  Next Visit Date: 8/16/2024    Future Appointments   Date Time Provider Department Center   8/16/2024  9:00 AM SCHEDULE, P [facility]       Health Maintenance   Topic Date Due    COVID-19 Vaccine (1) Never done    Colorectal Cancer Screen  12/03/2016    Shingles vaccine (1 of 2) Never done    Flu vaccine (1) 08/01/2024    Hepatitis B vaccine (2 of 3 - 19+ 3-dose series) 08/12/2024    A1C test (Diabetic or Prediabetic)  07/15/2025    Lipids  07/15/2025    Depression Screen  07/15/2025    DTaP/Tdap/Td vaccine (2 - Td or Tdap) 03/10/2027    Hepatitis C screen  Completed    HIV screen  Completed    Hepatitis A vaccine  Aged Out    Hib vaccine  Aged Out    Polio vaccine  Aged Out    Meningococcal (ACWY) vaccine  Aged Out    Pneumococcal 0-64 years Vaccine  Aged Out    Diabetes screen  Discontinued       Hemoglobin A1C (%)   Date Value   07/15/2024 6.1 (H)             ( goal A1C is < 7)   No components found for: \"LABMICR\"  No components found for: \"LDLCHOLESTEROL\", \"LDLCALC\"    (goal LDL is <100)   AST (U/L)   Date Value   02/20/2024 23     ALT (U/L)   Date Value   02/20/2024 30     BUN (mg/dL)   Date Value   02/21/2024 12     BP Readings from Last 3 Encounters:   07/16/24 (!) 158/94   07/15/24 136/78   02/22/24 93/81          (goal 120/80)    All Future Testing planned in [facility]  Lab Frequency Next Occurrence   Comprehensive Metabolic Panel Once 07/16/2024   Valproic Acid Level, Total and Free Once 07/16/2024         Patient Active Problem List:     Seizure disorder     Pure hyperglyceridemia     Chronic bilateral low back pain without sciatica     NSAID induced gastritis

## 2024-08-16 ENCOUNTER — NURSE ONLY (OUTPATIENT)
Dept: INTERNAL MEDICINE | Age: 54
End: 2024-08-16
Payer: MEDICAID

## 2024-08-16 DIAGNOSIS — Z23 NEED FOR HEPATITIS B VACCINATION: Primary | ICD-10-CM

## 2024-08-16 PROCEDURE — 90746 HEPB VACCINE 3 DOSE ADULT IM: CPT | Performed by: STUDENT IN AN ORGANIZED HEALTH CARE EDUCATION/TRAINING PROGRAM

## 2024-10-22 DIAGNOSIS — G40.909 SEIZURE DISORDER (HCC): ICD-10-CM

## 2024-10-25 RX ORDER — DIVALPROEX SODIUM 500 MG/1
500 TABLET, FILM COATED, EXTENDED RELEASE ORAL DAILY
Qty: 30 TABLET | Refills: 3 | Status: SHIPPED | OUTPATIENT
Start: 2024-10-25

## 2024-10-31 ENCOUNTER — TELEPHONE (OUTPATIENT)
Dept: NEUROLOGY | Age: 54
End: 2024-10-31

## 2024-10-31 NOTE — TELEPHONE ENCOUNTER
Patient called and stated he has been having headaches for the past few days and the medication that he is taking is not helping. Please advise.

## 2025-01-15 ENCOUNTER — OFFICE VISIT (OUTPATIENT)
Dept: NEUROLOGY | Age: 55
End: 2025-01-15
Payer: MEDICAID

## 2025-01-15 VITALS
BODY MASS INDEX: 29.19 KG/M2 | SYSTOLIC BLOOD PRESSURE: 132 MMHG | HEIGHT: 67 IN | WEIGHT: 186 LBS | HEART RATE: 73 BPM | DIASTOLIC BLOOD PRESSURE: 85 MMHG

## 2025-01-15 DIAGNOSIS — E78.49 OTHER HYPERLIPIDEMIA: ICD-10-CM

## 2025-01-15 DIAGNOSIS — G43.009 MIGRAINE WITHOUT AURA AND WITHOUT STATUS MIGRAINOSUS, NOT INTRACTABLE: Primary | ICD-10-CM

## 2025-01-15 PROCEDURE — G8419 CALC BMI OUT NRM PARAM NOF/U: HCPCS

## 2025-01-15 PROCEDURE — 1036F TOBACCO NON-USER: CPT

## 2025-01-15 PROCEDURE — 3017F COLORECTAL CA SCREEN DOC REV: CPT

## 2025-01-15 PROCEDURE — 99214 OFFICE O/P EST MOD 30 MIN: CPT

## 2025-01-15 PROCEDURE — G8427 DOCREV CUR MEDS BY ELIG CLIN: HCPCS

## 2025-01-15 RX ORDER — PROPRANOLOL HCL 20 MG
20 TABLET ORAL 2 TIMES DAILY
Qty: 90 TABLET | Refills: 3 | Status: SHIPPED | OUTPATIENT
Start: 2025-01-15

## 2025-01-15 RX ORDER — ATORVASTATIN CALCIUM 20 MG/1
20 TABLET, FILM COATED ORAL DAILY
Qty: 30 TABLET | Refills: 2 | Status: SHIPPED | OUTPATIENT
Start: 2025-01-15 | End: 2025-04-15

## 2025-01-15 RX ORDER — SUMATRIPTAN 50 MG/1
100 TABLET, FILM COATED ORAL 2 TIMES DAILY PRN
Qty: 9 TABLET | Refills: 1 | Status: SHIPPED | OUTPATIENT
Start: 2025-01-15

## 2025-01-15 ASSESSMENT — ENCOUNTER SYMPTOMS
SHORTNESS OF BREATH: 0
VOICE CHANGE: 0
TROUBLE SWALLOWING: 0
PHOTOPHOBIA: 0
VOMITING: 0
WHEEZING: 0
COLOR CHANGE: 0
NAUSEA: 0
CHEST TIGHTNESS: 0

## 2025-01-15 NOTE — PROGRESS NOTES
[address]  [place] [address]  P: [phone]  F: [phone]    NEUROLOGY CLINIC NOTE     PATIENT NAME: Eugene Ross Jr.  PATIENT MRN: [id number]  PRIMARY CARE PHYSICIAN: Shavon Stoddard MD    HPI:      Eugene Ross Jr. is a 54 y.o. with past medical history of seizures, headaches, HLD presents to clinic for ongoing care regarding seizures and headaches.     Since starting Depakote his headaches have essentially resolved, less than once per week. Tylenol helps.   Patient states his seizures started around 2013, typically has aura of headaches, and usually occur during sleep. He reports episodes where he woke up in a tonic clonic seizure. He typically experiences tongue bite, urinary incontinence, lip bite, confusion, body aches post seizure. He used to take Dilantin for it, but stopped it due to lack of efficacy. He presented to [facility] in February 2024 for seizure and headache, and was started on Depakote 500 mg BID. He stopped taking the morning dose of Depakote as it made him sleepy and lethargic. He reports being seizure free thus far using the night time dose only.     He also reports one headache in the last week. He reports decrease in headache days. Patient denies abdominal pain, jaundice, changes in bowel habits.     Today, on 1/15/2025, patient presents for follow-up in clinic.  He has not had a recurrence of seizure activity since February 2024.  He is maintained on Depakote extended release 500 mg daily.  He does report episodes of headache with migrainous features occurring approximately 8 days out of the month.  He uses Advil for abortive therapy, and he is not on preventative therapy at this time.  He states he is in good health overall.    History obtained from Patient and EMR.         PATIENT HISTORY:     Past Medical History:   Diagnosis Date    Acute kidney injury (HCC) 5/2/2015    Allergic rhinitis     Chronic back

## 2025-01-15 NOTE — PATIENT INSTRUCTIONS
Please start taking Propranolol 20 mg twice daily for migraine prevention   Start taking Imitrex 100 mg at the onset of a migraine headache  Continue Depakote

## 2025-01-24 ENCOUNTER — PREP FOR PROCEDURE (OUTPATIENT)
Dept: GASTROENTEROLOGY | Age: 55
End: 2025-01-24

## 2025-01-24 DIAGNOSIS — Z12.11 COLON CANCER SCREENING: ICD-10-CM

## 2025-01-24 NOTE — TELEPHONE ENCOUNTER
Procedure scheduled/Dr Escoto  Procedure:colon  Dx: screening  Date:04/30/2025  Time:  [id number] aml:  PAT phone call:kayceed  Bowel Prep instructions given:brad  In office/via phone: phone  Clearance needed:none  GLP - 1:

## 2025-01-26 RX ORDER — POLYETHYLENE GLYCOL 3350, SODIUM SULFATE ANHYDROUS, SODIUM BICARBONATE, SODIUM CHLORIDE, POTASSIUM CHLORIDE 236; 22.74; 6.74; 5.86; 2.97 G/4L; G/4L; G/4L; G/4L; G/4L
POWDER, FOR SOLUTION ORAL
Qty: 4000 ML | Refills: 0 | Status: SHIPPED | OUTPATIENT
Start: 2025-01-26

## 2025-01-30 DIAGNOSIS — G40.909 SEIZURE DISORDER (HCC): ICD-10-CM

## 2025-01-30 RX ORDER — DIVALPROEX SODIUM 500 MG/1
500 TABLET, FILM COATED, EXTENDED RELEASE ORAL DAILY
Qty: 30 TABLET | Refills: 3 | Status: SHIPPED | OUTPATIENT
Start: 2025-01-30

## 2025-01-30 NOTE — TELEPHONE ENCOUNTER
Pt is requesting refill of:     Medication: Depakote  mg     Active on Med List: Yes     Last Office Visit: 1/15/25    Next Office Visit: 6/18/25    Please refill is request is acceptable. Thank you

## 2025-02-23 PROBLEM — Z12.11 COLON CANCER SCREENING: Status: RESOLVED | Noted: 2025-01-24 | Resolved: 2025-02-23

## 2025-03-07 ENCOUNTER — OFFICE VISIT (OUTPATIENT)
Dept: INTERNAL MEDICINE | Age: 55
End: 2025-03-07
Payer: MEDICAID

## 2025-03-07 VITALS
WEIGHT: 191 LBS | TEMPERATURE: 97 F | SYSTOLIC BLOOD PRESSURE: 126 MMHG | BODY MASS INDEX: 29.98 KG/M2 | DIASTOLIC BLOOD PRESSURE: 88 MMHG | HEIGHT: 67 IN | RESPIRATION RATE: 16 BRPM | OXYGEN SATURATION: 98 % | HEART RATE: 98 BPM

## 2025-03-07 DIAGNOSIS — E78.5 HYPERLIPIDEMIA, UNSPECIFIED HYPERLIPIDEMIA TYPE: ICD-10-CM

## 2025-03-07 DIAGNOSIS — Z92.29: ICD-10-CM

## 2025-03-07 DIAGNOSIS — R56.9 SEIZURES (HCC): ICD-10-CM

## 2025-03-07 DIAGNOSIS — G47.00 INSOMNIA, UNSPECIFIED TYPE: Primary | ICD-10-CM

## 2025-03-07 DIAGNOSIS — Z23 NEED FOR HEPATITIS B VACCINATION: ICD-10-CM

## 2025-03-07 DIAGNOSIS — G43.E09 CHRONIC MIGRAINE WITH AURA WITHOUT STATUS MIGRAINOSUS, NOT INTRACTABLE: ICD-10-CM

## 2025-03-07 DIAGNOSIS — Z23 NEEDS FLU SHOT: ICD-10-CM

## 2025-03-07 PROCEDURE — 90677 PCV20 VACCINE IM: CPT | Performed by: STUDENT IN AN ORGANIZED HEALTH CARE EDUCATION/TRAINING PROGRAM

## 2025-03-07 PROCEDURE — 99212 OFFICE O/P EST SF 10 MIN: CPT | Performed by: STUDENT IN AN ORGANIZED HEALTH CARE EDUCATION/TRAINING PROGRAM

## 2025-03-07 PROCEDURE — 90656 IIV3 VACC NO PRSV 0.5 ML IM: CPT | Performed by: STUDENT IN AN ORGANIZED HEALTH CARE EDUCATION/TRAINING PROGRAM

## 2025-03-07 SDOH — ECONOMIC STABILITY: FOOD INSECURITY: WITHIN THE PAST 12 MONTHS, YOU WORRIED THAT YOUR FOOD WOULD RUN OUT BEFORE YOU GOT MONEY TO BUY MORE.: NEVER TRUE

## 2025-03-07 SDOH — ECONOMIC STABILITY: FOOD INSECURITY: WITHIN THE PAST 12 MONTHS, THE FOOD YOU BOUGHT JUST DIDN'T LAST AND YOU DIDN'T HAVE MONEY TO GET MORE.: NEVER TRUE

## 2025-03-07 ASSESSMENT — PATIENT HEALTH QUESTIONNAIRE - PHQ9
7. TROUBLE CONCENTRATING ON THINGS, SUCH AS READING THE NEWSPAPER OR WATCHING TELEVISION: NOT AT ALL
SUM OF ALL RESPONSES TO PHQ QUESTIONS 1-9: 3
8. MOVING OR SPEAKING SO SLOWLY THAT OTHER PEOPLE COULD HAVE NOTICED. OR THE OPPOSITE, BEING SO FIGETY OR RESTLESS THAT YOU HAVE BEEN MOVING AROUND A LOT MORE THAN USUAL: NOT AT ALL
2. FEELING DOWN, DEPRESSED OR HOPELESS: NOT AT ALL
1. LITTLE INTEREST OR PLEASURE IN DOING THINGS: NOT AT ALL
4. FEELING TIRED OR HAVING LITTLE ENERGY: SEVERAL DAYS
SUM OF ALL RESPONSES TO PHQ QUESTIONS 1-9: 3
5. POOR APPETITE OR OVEREATING: NOT AT ALL
10. IF YOU CHECKED OFF ANY PROBLEMS, HOW DIFFICULT HAVE THESE PROBLEMS MADE IT FOR YOU TO DO YOUR WORK, TAKE CARE OF THINGS AT HOME, OR GET ALONG WITH OTHER PEOPLE: NOT DIFFICULT AT ALL
9. THOUGHTS THAT YOU WOULD BE BETTER OFF DEAD, OR OF HURTING YOURSELF: NOT AT ALL
3. TROUBLE FALLING OR STAYING ASLEEP: MORE THAN HALF THE DAYS
6. FEELING BAD ABOUT YOURSELF - OR THAT YOU ARE A FAILURE OR HAVE LET YOURSELF OR YOUR FAMILY DOWN: NOT AT ALL

## 2025-03-07 ASSESSMENT — ENCOUNTER SYMPTOMS
RESPIRATORY NEGATIVE: 1
EYES NEGATIVE: 1
GASTROINTESTINAL NEGATIVE: 1
COUGH: 0
EYE REDNESS: 0
EYE ITCHING: 0
ALLERGIC/IMMUNOLOGIC NEGATIVE: 1

## 2025-03-07 NOTE — PROGRESS NOTES
Attending Physician Statement  I have discussed the care of Eugene NILSON Ross Jr., including pertinent history and exam findings with the resident. I have reviewed the key elements of all parts of the encounter with the resident. I agree with the assessment, and status of the problem list as documented. The plan and orders should include   Orders Placed This Encounter   Procedures    Pneumococcal, PCV20, PREVNAR 20, (age 6w+), IM, PF    Influenza, AFLURIA Trivalent, (age 3 y+), IM, Preservative Free, 0.5mL    Lipid Panel    TSH reflex to FT4    and this was also documented by the resident. The medication list was reviewed with the resident and is up to date.      Diagnosis Orders   1. Insomnia, unspecified type  melatonin (RA MELATONIN) 3 MG TABS tablet    TSH reflex to FT4      2. Hyperlipidemia, unspecified hyperlipidemia type  Lipid Panel      3. Seizures (HCC)        4. Chronic migraine with aura without status migrainosus, not intractable        5. Need for hepatitis B vaccination        6. Vaccination against Streptococcus pneumoniae within past 5 years  Pneumococcal, PCV20, PREVNAR 20, (age 6w+), IM, PF      7. Needs flu shot  Influenza, AFLURIA Trivalent, (age 3 y+), IM, Preservative Free, 0.5mL           Makeda Zuniga MD   Attending Physician, [facility]   Faculty, Internal Medicine Residency Program  [facility]     
Imitrex    Received flu vaccine and pneumococcal vaccine    Supposed to follow-up for colonoscopy in April  ______________________________________________________________________  Follow up and instructions:  No follow-ups on file.  Follow-up 6 months  Eugene received counseling on the following healthy behaviors: nutrition, exercise, and medication adherence    Discussed use, benefit, and side effects of prescribed medications.  Barriers to medication compliance addressed.  All patient questions answered.  Pt voiced understanding.     Patient given educational materials - see patient instructions    Shavon Stoddard MD   Internal Medicine  3/7/2025, 12:06 PM    This note is created with the assistance of a speech-recognition program. While intending to generate a document that actually reflects the content of the visit, the document can still have some mistakes which may not have been identified and corrected by editing.

## 2025-04-10 DIAGNOSIS — E78.49 OTHER HYPERLIPIDEMIA: ICD-10-CM

## 2025-04-10 DIAGNOSIS — G43.009 MIGRAINE WITHOUT AURA AND WITHOUT STATUS MIGRAINOSUS, NOT INTRACTABLE: ICD-10-CM

## 2025-04-10 NOTE — TELEPHONE ENCOUNTER
Pt is requesting refill of:     Medication: Lipitor 20 mg     Active on Med List: Yes     Last Office Visit: 1/15/25    Next Office Visit: 6/18/25    Please refill is request is acceptable. Thank you         Pt is requesting refill of:     Medication: Imitrex 50 mg     Active on Med List: Yes    Last Office Visit: 1/15/25    Next Office Visit: 6/18/25    Please refill is request is acceptable. Thank you

## 2025-04-14 RX ORDER — ATORVASTATIN CALCIUM 20 MG/1
20 TABLET, FILM COATED ORAL DAILY
Qty: 30 TABLET | Refills: 2 | Status: SHIPPED | OUTPATIENT
Start: 2025-04-14 | End: 2025-07-13

## 2025-04-14 RX ORDER — SUMATRIPTAN 50 MG/1
TABLET, FILM COATED ORAL
Qty: 9 TABLET | Refills: 1 | Status: SHIPPED | OUTPATIENT
Start: 2025-04-14

## 2025-04-18 ENCOUNTER — HOSPITAL ENCOUNTER (OUTPATIENT)
Age: 55
Setting detail: SPECIMEN
Discharge: HOME | End: 2025-04-18

## 2025-04-18 DIAGNOSIS — G47.00 INSOMNIA, UNSPECIFIED TYPE: ICD-10-CM

## 2025-04-18 DIAGNOSIS — E78.5 HYPERLIPIDEMIA, UNSPECIFIED HYPERLIPIDEMIA TYPE: ICD-10-CM

## 2025-04-18 LAB
CHOLEST SERPL-MCNC: 207 MG/DL (ref 0–199)
CHOLESTEROL/HDL RATIO: 3.5
HDLC SERPL-MCNC: 60 MG/DL
LDLC SERPL CALC-MCNC: 124 MG/DL (ref 0–100)
TRIGL SERPL-MCNC: 116 MG/DL
TSH SERPL DL<=0.05 MIU/L-ACNC: 1.57 UIU/ML (ref 0.27–4.2)
VLDLC SERPL CALC-MCNC: 23 MG/DL (ref 1–30)

## 2025-04-20 ENCOUNTER — RESULTS FOLLOW-UP (OUTPATIENT)
Dept: INTERNAL MEDICINE CLINIC | Age: 55
End: 2025-04-20

## 2025-04-20 DIAGNOSIS — E78.2 MIXED HYPERLIPIDEMIA: Primary | ICD-10-CM

## 2025-04-20 RX ORDER — ATORVASTATIN CALCIUM 40 MG/1
40 TABLET, FILM COATED ORAL DAILY
Qty: 30 TABLET | Refills: 2 | Status: SHIPPED | OUTPATIENT
Start: 2025-04-20 | End: 2025-04-21

## 2025-04-20 NOTE — RESULT ENCOUNTER NOTE
Please let the patient know that his cholesterol and LDL is high. Will start the patient on lipitor 40, thanks

## 2025-04-21 ENCOUNTER — HOSPITAL ENCOUNTER (OUTPATIENT)
Dept: PREADMISSION TESTING | Age: 55
Discharge: HOME | End: 2025-04-25

## 2025-04-21 VITALS — HEIGHT: 67 IN | BODY MASS INDEX: 28.41 KG/M2 | WEIGHT: 181 LBS

## 2025-04-21 NOTE — PROGRESS NOTES
Pre-op Instructions For Out-Patient Endoscopy Surgery    Medication Instructions:  Please stop herbs and any supplements now (includes vitamins and minerals).    For these medications:  Dulaglutide (Trulicity), Exenatide (Byetta and Bydureon, Liraglutide (Victoza), Lixisenatide (Adlyxin), Semaglutide (Ozempic and Rybelsus), Tirzepatide (Mounjaro, Zepbound)- Stop 1 week prior if taking weekly or 1 day prior if taking every 12 hours or daily.     Please contact your surgeon and prescribing physician for pre-op instructions for any blood thinners.    If you have inhalers/aerosol treatments at home, please use them the morning of your surgery and bring the inhalers with you to the hospital.    Please take the following medications the morning of your surgery with a sip of water:    Depakote    Surgery Instructions:  After midnight before surgery:  Do not eat or drink anything, including water, mints, gum, and hard candy.  You may brush your teeth without swallowing.  No smoking, chewing tobacco, or street drugs.     ** Please Follow Bowel Prep instructions if given by surgeon's office**    Please shower or bathe before surgery.       Please do not wear any cologne, lotion, powder, jewelry, piercings, perfume, makeup, nail polish, hair accessories, or hair spray on the day of surgery.  Wear loose comfortable clothing.    Leave your valuables at home but bring a payment source for any after-surgery prescriptions you plan to fill at [facility].  Bring a storage case for any glasses/contacts.    An adult who is responsible for you MUST drive you home and should be with you for the first 24 hours after surgery.     The Day of Surgery:  Arrive at [facility] Entr[facility] at the time directed by your surgeon and check in at the desk.     If you have a living will or healthcare power of , please bring a copy.    You will be taken to the pre-op holding area where you will be prepared for

## 2025-04-24 ENCOUNTER — TELEPHONE (OUTPATIENT)
Dept: INTERNAL MEDICINE | Age: 55
End: 2025-04-24

## 2025-04-24 NOTE — TELEPHONE ENCOUNTER
Patient states he is supposed to have something called Qocalaxe, which is 4 pills to take before his colonoscopy. Patient states he was told by a  that this is required before the procedure. Writer could not find the order to pend. Please advise.

## 2025-04-25 NOTE — TELEPHONE ENCOUNTER
Patient called and educated that the prep is ordered by the colonoscopy center, he expressed understanding and will reach out to us if they are unable to order it   Thanks !

## 2025-04-25 NOTE — TELEPHONE ENCOUNTER
Hello, usually before colonoscopy the prescribers give Golytely, but the colonoscopy center or the nurse there prescribes it, I would recommend him to call the center.

## 2025-04-28 NOTE — PRE-PROCEDURE INSTRUCTIONS
No answer, left message ?                             Unable to leave message ?    When were you told to arrive at hospital ?  0800    Do you have a  ? yes    Are you on any blood thinners ? no                    If yes when did you stop taking ?    Do you have your prep Rx filled and instruction ?  Yes has golytely    Nothing to eat the day before , only clear liquids.    Are you experiencing any covid symptoms ? no    Do you have any infections or rash we should be aware of ? no      Do you have the Hibiclens soap to use the night before and the morning of surgery ?    Nothing to eat or drink after midnight, only a sip of water to take any medication instructed to take the night before.no  Wear comfortable clothing, leave any valuables at home, remove any jewelry and body piercing .no

## 2025-04-29 ENCOUNTER — ANESTHESIA EVENT (OUTPATIENT)
Dept: ENDOSCOPY | Age: 55
End: 2025-04-29
Payer: MEDICAID

## 2025-04-30 ENCOUNTER — ANESTHESIA (OUTPATIENT)
Dept: ENDOSCOPY | Age: 55
End: 2025-04-30
Payer: MEDICAID

## 2025-04-30 ENCOUNTER — HOSPITAL ENCOUNTER (OUTPATIENT)
Age: 55
Setting detail: OUTPATIENT SURGERY
Discharge: HOME | End: 2025-04-30
Attending: STUDENT IN AN ORGANIZED HEALTH CARE EDUCATION/TRAINING PROGRAM | Admitting: STUDENT IN AN ORGANIZED HEALTH CARE EDUCATION/TRAINING PROGRAM
Payer: MEDICAID

## 2025-04-30 VITALS
WEIGHT: 181 LBS | SYSTOLIC BLOOD PRESSURE: 120 MMHG | DIASTOLIC BLOOD PRESSURE: 87 MMHG | HEART RATE: 57 BPM | HEIGHT: 67 IN | TEMPERATURE: 97 F | OXYGEN SATURATION: 100 % | RESPIRATION RATE: 17 BRPM | BODY MASS INDEX: 28.41 KG/M2

## 2025-04-30 DIAGNOSIS — Z12.11 COLON CANCER SCREENING: ICD-10-CM

## 2025-04-30 PROCEDURE — 2709999900 HC NON-CHARGEABLE SUPPLY: Performed by: STUDENT IN AN ORGANIZED HEALTH CARE EDUCATION/TRAINING PROGRAM

## 2025-04-30 PROCEDURE — 88305 TISSUE EXAM BY PATHOLOGIST: CPT

## 2025-04-30 PROCEDURE — 7100000011 HC PHASE II RECOVERY - ADDTL 15 MIN: Performed by: STUDENT IN AN ORGANIZED HEALTH CARE EDUCATION/TRAINING PROGRAM

## 2025-04-30 PROCEDURE — 6360000002 HC RX W HCPCS: Performed by: ANESTHESIOLOGY

## 2025-04-30 PROCEDURE — 3609010600 HC COLONOSCOPY POLYPECTOMY SNARE/COLD BIOPSY: Performed by: STUDENT IN AN ORGANIZED HEALTH CARE EDUCATION/TRAINING PROGRAM

## 2025-04-30 PROCEDURE — 6360000002 HC RX W HCPCS: Performed by: NURSE ANESTHETIST, CERTIFIED REGISTERED

## 2025-04-30 PROCEDURE — C1889 IMPLANT/INSERT DEVICE, NOC: HCPCS | Performed by: STUDENT IN AN ORGANIZED HEALTH CARE EDUCATION/TRAINING PROGRAM

## 2025-04-30 PROCEDURE — 3700000000 HC ANESTHESIA ATTENDED CARE: Performed by: STUDENT IN AN ORGANIZED HEALTH CARE EDUCATION/TRAINING PROGRAM

## 2025-04-30 PROCEDURE — 7100000010 HC PHASE II RECOVERY - FIRST 15 MIN: Performed by: STUDENT IN AN ORGANIZED HEALTH CARE EDUCATION/TRAINING PROGRAM

## 2025-04-30 PROCEDURE — 2580000003 HC RX 258: Performed by: ANESTHESIOLOGY

## 2025-04-30 PROCEDURE — 3700000001 HC ADD 15 MINUTES (ANESTHESIA): Performed by: STUDENT IN AN ORGANIZED HEALTH CARE EDUCATION/TRAINING PROGRAM

## 2025-04-30 DEVICE — CLIP LIG L235CM RESOL 360 BX/20: Type: IMPLANTABLE DEVICE | Site: ASCENDING COLON | Status: FUNCTIONAL

## 2025-04-30 RX ORDER — SODIUM CHLORIDE 9 MG/ML
INJECTION, SOLUTION INTRAVENOUS PRN
Status: DISCONTINUED | OUTPATIENT
Start: 2025-04-30 | End: 2025-04-30 | Stop reason: HOSPADM

## 2025-04-30 RX ORDER — LIDOCAINE HYDROCHLORIDE 10 MG/ML
1 INJECTION, SOLUTION EPIDURAL; INFILTRATION; INTRACAUDAL; PERINEURAL
Status: COMPLETED | OUTPATIENT
Start: 2025-04-30 | End: 2025-04-30

## 2025-04-30 RX ORDER — PROPOFOL 10 MG/ML
INJECTION, EMULSION INTRAVENOUS
Status: DISCONTINUED | OUTPATIENT
Start: 2025-04-30 | End: 2025-04-30 | Stop reason: SDUPTHER

## 2025-04-30 RX ORDER — SODIUM CHLORIDE, SODIUM LACTATE, POTASSIUM CHLORIDE, CALCIUM CHLORIDE 600; 310; 30; 20 MG/100ML; MG/100ML; MG/100ML; MG/100ML
INJECTION, SOLUTION INTRAVENOUS CONTINUOUS
Status: DISCONTINUED | OUTPATIENT
Start: 2025-04-30 | End: 2025-04-30 | Stop reason: HOSPADM

## 2025-04-30 RX ORDER — SODIUM CHLORIDE 0.9 % (FLUSH) 0.9 %
5-40 SYRINGE (ML) INJECTION EVERY 12 HOURS SCHEDULED
Status: DISCONTINUED | OUTPATIENT
Start: 2025-04-30 | End: 2025-04-30 | Stop reason: HOSPADM

## 2025-04-30 RX ORDER — MIDAZOLAM HYDROCHLORIDE 2 MG/2ML
INJECTION, SOLUTION INTRAMUSCULAR; INTRAVENOUS
Status: DISCONTINUED | OUTPATIENT
Start: 2025-04-30 | End: 2025-04-30 | Stop reason: SDUPTHER

## 2025-04-30 RX ORDER — SIMETHICONE 40MG/0.6ML
SUSPENSION, DROPS(FINAL DOSAGE FORM)(ML) ORAL PRN
Status: DISCONTINUED | OUTPATIENT
Start: 2025-04-30 | End: 2025-04-30 | Stop reason: ALTCHOICE

## 2025-04-30 RX ORDER — SODIUM CHLORIDE 0.9 % (FLUSH) 0.9 %
5-40 SYRINGE (ML) INJECTION PRN
Status: DISCONTINUED | OUTPATIENT
Start: 2025-04-30 | End: 2025-04-30 | Stop reason: HOSPADM

## 2025-04-30 RX ADMIN — LIDOCAINE HYDROCHLORIDE 1 ML: 10 INJECTION, SOLUTION EPIDURAL; INFILTRATION; INTRACAUDAL; PERINEURAL at 08:40

## 2025-04-30 RX ADMIN — MIDAZOLAM HYDROCHLORIDE 2 MG: 1 INJECTION, SOLUTION INTRAMUSCULAR; INTRAVENOUS at 10:13

## 2025-04-30 RX ADMIN — SODIUM CHLORIDE, POTASSIUM CHLORIDE, SODIUM LACTATE AND CALCIUM CHLORIDE: 600; 310; 30; 20 INJECTION, SOLUTION INTRAVENOUS at 08:41

## 2025-04-30 RX ADMIN — PROPOFOL 50 MG: 10 INJECTION, EMULSION INTRAVENOUS at 10:15

## 2025-04-30 RX ADMIN — PROPOFOL 175 MCG/KG/MIN: 10 INJECTION, EMULSION INTRAVENOUS at 10:15

## 2025-04-30 ASSESSMENT — PAIN - FUNCTIONAL ASSESSMENT
PAIN_FUNCTIONAL_ASSESSMENT: ADULT NONVERBAL PAIN SCALE (NPVS)
PAIN_FUNCTIONAL_ASSESSMENT: 0-10

## 2025-04-30 ASSESSMENT — ENCOUNTER SYMPTOMS
SORE THROAT: 0
COUGH: 0
SHORTNESS OF BREATH: 0

## 2025-04-30 NOTE — OP NOTE
COLONOSCOPY    DATE OF PROCEDURE: 4/30/2025    SURGEON: Lb Escoto MD  Facility : [facility]  ASSISTANT: None  PREOPERATIVE DIAGNOSIS: Screening colonoscopy    POSTOPERATIVE DIAGNOSIS: as described below    OPERATION: Total colonoscopy with biopsy and snare polypectomy    ANESTHESIA: Monitored Anesthesia Care (MAC)    ESTIMATED BLOOD LOSS: less than 50 cc    COMPLICATIONS: None.     SPECIMENS:  Was Obtained:     ID Type Source Tests Collected by Time Destination   A : cecal polyp Tissue Cecum SURGICAL PATHOLOGY Lb Escoto MD 4/30/2025 1022    B : ascending polyp Tissue Colon-Ascending SURGICAL PATHOLOGY Lb Escoto MD 4/30/2025 1030    C : sigmoid polyps Tissue Sigmoid Colon SURGICAL PATHOLOGY Lb Escoto MD 4/30/2025 1042         HISTORY: The patient is a 54 y.o. year old male with history of above preop diagnosis.  I recommended colonoscopy with possible biopsy or polypectomy and I explained the risk, benefits, expected outcome, and alternatives to the procedure.  Risks included but are not limited to medication allergy, medication reaction, cardiovascular and respiratory problems, bleeding, perforation, infection, and/or missed diagnosis.  The patient understands and is in agreement.        PROCEDURE: Following arrival in the endoscopy room, the patient was placed in the left lateral decubitus position and final time-out accomplished in the presence of the nursing staff. Baseline vital signs were obtained and reviewed. The patient was given IV Monitored Anesthesia Care (MAC) and vitals monitoring per anesthesia department.     Digital rectal exam- normal    The colonoscope was inserted per rectum and advanced under direct vision to the cecum without difficulty.  Photodocumentation of the maximal extent reached (cecum, appendiceal orifice, ileocecal valve, and terminal ileum if indicated) and other findings was obtained.     Post sedation note :The patient's SPO2 remained above

## 2025-04-30 NOTE — ANESTHESIA POSTPROCEDURE EVALUATION
Department of Anesthesiology  Postprocedure Note    Patient: Eugene Ross Jr.  MRN: [id number]  YOB: 1970  Date of evaluation: 4/30/2025    Procedure Summary       Date: 04/30/25 Room / Location: [place] [address] / [facility]    Anesthesia Start: 1012 Anesthesia Stop: 1056    Procedure: COLONOSCOPY SNARE POLYPECTOMY HOT BIOPSY (Rectum) Diagnosis:       Colon cancer screening      (Colon cancer screening [Z12.11])    Surgeons: Lb Escoto MD Responsible Provider: Vesna Andre MD    Anesthesia Type: General ASA Status: 3            Anesthesia Type: General    Wesley Phase I: Wesley Score: 10    Wesley Phase II: Wesley Score: 10    Anesthesia Post Evaluation    Comments: POST- ANESTHESIA EVALUATION       Pt Name: Eugene Ross Jr.  MRN: [id number]  YOB: 1970  Date of evaluation: 4/30/2025  Time:  4:17 PM      /87   Pulse 57   Temp 97 °F (36.1 °C)   Resp 17   Ht 1.702 m (5' 7.01\")   Wt 82.1 kg (181 lb)   SpO2 100%   BMI 28.34 kg/m²      Consciousness Level  Awake  Cardiopulmonary Status  Stable  Pain Adequately Treated YES  Nausea / Vomiting  NO  Adequate Hydration  YES  Anesthesia Related Complications NONE      Electronically signed by Vesna Andre MD on 4/30/2025 at 4:17 PM           No notable events documented.

## 2025-04-30 NOTE — H&P
HISTORY and PHYSICAL  [facility]       NAME:  Eugene Ross Jr.  MRN: [id number]   YOB: 1970   Date: 4/30/2025   Age: 54 y.o.  Gender: male       COMPLAINT AND PRESENT HISTORY:       Eugene Ross Jr. is 54 y.o.  male, here for     Procedure(s):  COLORECTAL CANCER SCREENING, NOT HIGH RISK    Pre-Op Diagnosis Codes:      * Colon cancer screening [Z12.11]    Pt has not had previous colonoscopy.   Denies abdominal pain, dysphagia, heartburn.  Denies nausea, vomiting, diarrhea, constipation.  Pt has noticed blood in stool intermittently but not recently.   Denies dark tarry stools.  Denies changes in appetite and unintended weight loss.  Denies family history of colon cancer.    Completed and followed prescribed prep. NPO p MN. Denies taking any blood thinning medications. Denies recent or current chest pain/pressure, palpitations, SOB, recent URI, fever or chills. Pt reports last seizure about one year ago.       RECENT LABS, IMAGING AND TESTING     Lab Results   Component Value Date    WBC 5.8 02/21/2024    RBC 5.15 02/21/2024    HGB 14.7 02/21/2024    HCT 44.4 02/21/2024    MCV 86.2 02/21/2024    MCH 28.5 02/21/2024    MCHC 33.1 02/21/2024    RDW 12.2 02/21/2024     02/21/2024    MPV 8.9 02/21/2024        Lab Results   Component Value Date     02/21/2024    K 3.7 02/21/2024     02/21/2024    CO2 26 02/21/2024    BUN 12 02/21/2024    CREATININE 0.8 02/21/2024    GLUCOSE 105 (H) 02/21/2024    CALCIUM 9.0 02/21/2024    BILITOT 0.2 (L) 02/20/2024    ALKPHOS 116 02/20/2024    AST 23 02/20/2024    ALT 30 02/20/2024         PAST MEDICAL HISTORY     Past Medical History:   Diagnosis Date    Acute kidney injury 5/2/2015    Allergic rhinitis     Chronic back pain     on 10/26/19 pt denies pain mgmnt    Hyperlipidemia     Seizures (HCC)     Urinary incontinence        SURGICAL HISTORY       Past Surgical History:   Procedure Laterality Date    FACIAL COSMETIC SURGERY Left

## 2025-05-01 LAB — SURGICAL PATHOLOGY REPORT: NORMAL

## 2025-05-08 DIAGNOSIS — G40.909 SEIZURE DISORDER (HCC): ICD-10-CM

## 2025-05-08 RX ORDER — DIVALPROEX SODIUM 500 MG/1
500 TABLET, FILM COATED, EXTENDED RELEASE ORAL DAILY
Qty: 30 TABLET | Refills: 3 | Status: SHIPPED | OUTPATIENT
Start: 2025-05-08

## 2025-05-23 ENCOUNTER — OFFICE VISIT (OUTPATIENT)
Age: 55
End: 2025-05-23
Payer: MEDICAID

## 2025-05-23 VITALS
WEIGHT: 191 LBS | DIASTOLIC BLOOD PRESSURE: 87 MMHG | TEMPERATURE: 97.3 F | RESPIRATION RATE: 18 BRPM | SYSTOLIC BLOOD PRESSURE: 135 MMHG | HEIGHT: 67 IN | OXYGEN SATURATION: 97 % | HEART RATE: 74 BPM | BODY MASS INDEX: 29.98 KG/M2

## 2025-05-23 DIAGNOSIS — G44.209 TENSION HEADACHE: Primary | ICD-10-CM

## 2025-05-23 DIAGNOSIS — G43.E09 CHRONIC MIGRAINE WITH AURA WITHOUT STATUS MIGRAINOSUS, NOT INTRACTABLE: ICD-10-CM

## 2025-05-23 DIAGNOSIS — R56.9 SEIZURES (HCC): ICD-10-CM

## 2025-05-23 PROCEDURE — G8427 DOCREV CUR MEDS BY ELIG CLIN: HCPCS

## 2025-05-23 PROCEDURE — 1036F TOBACCO NON-USER: CPT

## 2025-05-23 PROCEDURE — 99212 OFFICE O/P EST SF 10 MIN: CPT

## 2025-05-23 PROCEDURE — 99213 OFFICE O/P EST LOW 20 MIN: CPT

## 2025-05-23 PROCEDURE — 3017F COLORECTAL CA SCREEN DOC REV: CPT

## 2025-05-23 PROCEDURE — G8419 CALC BMI OUT NRM PARAM NOF/U: HCPCS

## 2025-05-23 SDOH — ECONOMIC STABILITY: FOOD INSECURITY: WITHIN THE PAST 12 MONTHS, THE FOOD YOU BOUGHT JUST DIDN'T LAST AND YOU DIDN'T HAVE MONEY TO GET MORE.: NEVER TRUE

## 2025-05-23 SDOH — ECONOMIC STABILITY: FOOD INSECURITY: WITHIN THE PAST 12 MONTHS, YOU WORRIED THAT YOUR FOOD WOULD RUN OUT BEFORE YOU GOT MONEY TO BUY MORE.: NEVER TRUE

## 2025-05-23 ASSESSMENT — PATIENT HEALTH QUESTIONNAIRE - PHQ9
SUM OF ALL RESPONSES TO PHQ QUESTIONS 1-9: 0
SUM OF ALL RESPONSES TO PHQ QUESTIONS 1-9: 0
DEPRESSION UNABLE TO ASSESS: PT REFUSES
SUM OF ALL RESPONSES TO PHQ QUESTIONS 1-9: 0
SUM OF ALL RESPONSES TO PHQ QUESTIONS 1-9: 0
2. FEELING DOWN, DEPRESSED OR HOPELESS: NOT AT ALL
1. LITTLE INTEREST OR PLEASURE IN DOING THINGS: NOT AT ALL

## 2025-05-23 NOTE — PROGRESS NOTES
Attending Physician Statement  I have discussed the care of Eugene Ross , including pertinent history and exam findings, with the resident. I have seen and examined the patient and the key elements of all parts of the encounter have been performed by me.  I agree with the assessment, plan and orders as documented by the resident.  (GC Modifier)    MD ISABELA Chapa  Attending Physician  Internal Medicine Residency Program, Nephrology   [facility]  5/23/2025, 3:39 PM

## 2025-05-23 NOTE — PROGRESS NOTES
[facility]  [facility]  [address] [place] [place] [address]  Dept: [phone]  Dept Fax: [phone]    Office Progress/Follow Up Note  Date of patient's visit: 5/23/2025  Patient's Name:  Eugene Ross Jr. YOB: 1970            Patient Care Team:  Shavon Stoddard MD as PCP - General (Internal Medicine)    REASON FOR VISIT: acute care visit     HISTORY OF PRESENT ILLNESS:      Chief Complaint   Patient presents with    Follow-up     Patient present today for acute visit follow up for headache.patient states he is having headache in morning and evening but not everyday its been running since few weeks now.       History was obtained from the patient. Eugene Ross Jr. is a 54 y.o. is here for a acute care visit    The patient for concerns of headache ongoing for the last couple of weeks.  He describes the headache to br around the forehead, over the temples, like a band squeezing his head, does endorse being more stressed at home, have been more frequent lately but relieved with Tylenol.    Otherwise he denies any nausea, vomiting, photophobia, predisposing aura, fever, any fall, trauma, numbness, weakness, seizure-like activity associated with it.  He was last evaluated in February 24 for concerns of seizure-like activity and was started on Depakote has been compliant.  Since then he does not have any more seizures.  He also has history of migraines and was started on sumatriptan but he has never taken the medication.  Also he claims the headache to be a typical of a migraine.          Patient Active Problem List   Diagnosis    Seizure disorder    Pure hyperglyceridemia    Chronic bilateral low back pain without sciatica    NSAID induced gastritis    Seizures (HCC)    Seizure-like activity (HCC)    Migraine without aura and without status migrainosus, not intractable         Health Maintenance Due   Topic Date Due    Shingles vaccine (1 of 2) Never done    COVID-19

## 2025-06-13 ENCOUNTER — HOSPITAL ENCOUNTER (EMERGENCY)
Age: 55
Discharge: LEFT BEFORE DISCHARGE | End: 2025-06-13
Attending: EMERGENCY MEDICINE
Payer: MEDICAID

## 2025-06-13 VITALS
OXYGEN SATURATION: 96 % | SYSTOLIC BLOOD PRESSURE: 149 MMHG | HEART RATE: 66 BPM | TEMPERATURE: 97.9 F | BODY MASS INDEX: 29.2 KG/M2 | DIASTOLIC BLOOD PRESSURE: 93 MMHG | WEIGHT: 186.51 LBS | RESPIRATION RATE: 16 BRPM

## 2025-06-13 DIAGNOSIS — H61.21 IMPACTED CERUMEN OF RIGHT EAR: Primary | ICD-10-CM

## 2025-06-13 PROCEDURE — 6370000000 HC RX 637 (ALT 250 FOR IP)

## 2025-06-13 PROCEDURE — 69209 REMOVE IMPACTED EAR WAX UNI: CPT

## 2025-06-13 PROCEDURE — 99283 EMERGENCY DEPT VISIT LOW MDM: CPT

## 2025-06-13 RX ORDER — MAGNESIUM CARB/ALUMINUM HYDROX 105-160MG
TABLET,CHEWABLE ORAL ONCE
Status: COMPLETED | OUTPATIENT
Start: 2025-06-13 | End: 2025-06-13

## 2025-06-13 RX ADMIN — MINERAL OIL 30 ML: 1000 SOLUTION ORAL at 11:30

## 2025-06-13 ASSESSMENT — ENCOUNTER SYMPTOMS
SORE THROAT: 0
EYES NEGATIVE: 1
RESPIRATORY NEGATIVE: 1
SINUS PAIN: 0
GASTROINTESTINAL NEGATIVE: 1
FACIAL SWELLING: 0
VOICE CHANGE: 0
SINUS PRESSURE: 0

## 2025-06-13 NOTE — ED PROVIDER NOTES
[facility] EMERGENCY DEPARTMENT  Emergency Department Encounter  Emergency Medicine Resident     Pt Name:Eugene Ross Jr.  MRN: [id number]  Birthdate 1970  Date of evaluation: 6/13/25  PCP:  Shavon Stoddard MD  Note Started: 10:47 AM EDT      CHIEF COMPLAINT       Chief Complaint   Patient presents with    Ear Fullness       HISTORY OF PRESENT ILLNESS  (Location/Symptom, Timing/Onset, Context/Setting, Quality, Duration, Modifying Factors, Severity.)      Eugene Ross Jr. is a 54 y.o. male who presents with complaint of feeling of fullness of his right ear that started on in the morning, insidiously, without any feeling of fullness or pain in the left ear.  Patient mentioned that he has tried to irrigate his right ear on his own with mild and peroxide which has not helped him.  Patient denies any pain in the right ear as well as any discharge or bleeding from the right ear.  Patient denies any fever, chills, cough, congestion or other URI symptoms.  He denies any recent sick contacts.  He also complains of headache which has been chronic for him, located at the center of his head, throbbing without any photophobia or phonophobia noted.    PAST MEDICAL / SURGICAL / SOCIAL / FAMILY HISTORY      has a past medical history of Acute kidney injury, Allergic rhinitis, Chronic back pain, Hyperlipidemia, Seizures (HCC), and Urinary incontinence.       has a past surgical history that includes Facial cosmetic surgery (Left) and Colonoscopy (N/A, 4/30/2025).      Social History     Socioeconomic History    Marital status: Single     Spouse name: Not on file    Number of children: Not on file    Years of education: Not on file    Highest education level: Not on file   Occupational History    Not on file   Tobacco Use    Smoking status: Never    Smokeless tobacco: Never   Vaping Use    Vaping status: Never Used   Substance and Sexual Activity    Alcohol use: Not Currently     Comment: occasionally in past    Drug

## 2025-06-13 NOTE — ED PROVIDER NOTES
[facility]  Emergency Department  Faculty Attestation     I performed a history and physical examination of the patient and discussed management with the resident. I reviewed the resident’s note and agree with the documented findings and plan of care. Any areas of disagreement are noted on the chart. I was personally present for the key portions of any procedures. I have documented in the chart those procedures where I was not present during the key portions. I have reviewed the emergency nurses triage note. I agree with the chief complaint, past medical history, past surgical history, allergies, medications, social and family history as documented unless otherwise noted below.    For Physician Assistant/ Nurse Practitioner cases/documentation I have personally evaluated this patient and have completed at least one if not all key elements of the E/M (history, physical exam, and MDM). Additional findings are as noted.    Preliminary note started at 11:15 AM EDT    Primary Care Physician:  Shavon Stoddard MD    Screenings:  [unfilled]    CHIEF COMPLAINT       Chief Complaint   Patient presents with    Ear Fullness       RECENT VITALS:   BP (!) 149/93   Pulse 66   Temp 97.9 °F (36.6 °C)   Resp 16   Wt 84.6 kg (186 lb 8.2 oz)   SpO2 96%   BMI 29.20 kg/m²     LABS:  Labs Reviewed - No data to display    Radiology  No orders to display       ttending Physician Additional  Notes    Patient has right ear fullness with slight decrease in hearing for several days.  No relief with eardrops.  No rhinorrhea cough congestion.  He has mild intermittent migraine headaches which is presently resolved after Tylenol migraine preparation.  On exam is nontoxic afebrile vital signs normal.  No mastoid tenderness.  No tragus tenderness.  Right canal has dark wax obstructing the canal.  GCS is 15.  Normal speech mentation.  Cranial nerves intact.  Impression is cerumen impaction to the right

## 2025-06-13 NOTE — ED NOTES
Tight ear fullness. States he used peroxide at home and then some warm water after but the ear did not clear and now it is painful.

## 2025-07-18 ENCOUNTER — OFFICE VISIT (OUTPATIENT)
Age: 55
End: 2025-07-18
Payer: MEDICAID

## 2025-07-18 VITALS
WEIGHT: 186.4 LBS | TEMPERATURE: 97.2 F | OXYGEN SATURATION: 95 % | HEART RATE: 75 BPM | DIASTOLIC BLOOD PRESSURE: 74 MMHG | HEIGHT: 66 IN | SYSTOLIC BLOOD PRESSURE: 122 MMHG | BODY MASS INDEX: 29.96 KG/M2

## 2025-07-18 DIAGNOSIS — R73.03 PREDIABETES: ICD-10-CM

## 2025-07-18 DIAGNOSIS — E78.2 MIXED HYPERLIPIDEMIA: Primary | ICD-10-CM

## 2025-07-18 DIAGNOSIS — R56.9 SEIZURES (HCC): ICD-10-CM

## 2025-07-18 DIAGNOSIS — G43.009 MIGRAINE WITHOUT AURA AND WITHOUT STATUS MIGRAINOSUS, NOT INTRACTABLE: ICD-10-CM

## 2025-07-18 PROBLEM — G47.09 OTHER INSOMNIA: Status: ACTIVE | Noted: 2025-07-18

## 2025-07-18 PROCEDURE — 3017F COLORECTAL CA SCREEN DOC REV: CPT

## 2025-07-18 PROCEDURE — G8417 CALC BMI ABV UP PARAM F/U: HCPCS

## 2025-07-18 PROCEDURE — 99213 OFFICE O/P EST LOW 20 MIN: CPT

## 2025-07-18 PROCEDURE — G8427 DOCREV CUR MEDS BY ELIG CLIN: HCPCS

## 2025-07-18 PROCEDURE — 1036F TOBACCO NON-USER: CPT

## 2025-07-18 PROCEDURE — 99212 OFFICE O/P EST SF 10 MIN: CPT

## 2025-07-18 RX ORDER — ATORVASTATIN CALCIUM 40 MG/1
40 TABLET, FILM COATED ORAL DAILY
Qty: 30 TABLET | Refills: 3 | Status: SHIPPED | OUTPATIENT
Start: 2025-07-18 | End: 2025-10-16

## 2025-07-18 NOTE — PROGRESS NOTES
Attending Physician Statement  I have discussed the care of Eugene NILSON Ross Jr. including pertinent history and exam findings, with the resident. I have reviewed the key elements of all parts of the encounter with the resident.  I agree with the assessment, plan and orders as documented by the resident.  (GE Modifier)    MD ISABELA Chapa  Attending Physician, [facility]   Faculty, Internal Medicine Residency Program  [facility]  7/18/2025, 11:41 AM

## 2025-07-18 NOTE — PROGRESS NOTES
[facility]  [facility]  [address] [place] [address]  Dept: [phone]  Dept Fax: [phone]    New Patient Visit Note  Date of patient's visit: 7/18/2025  Patient's Name:  Eugene Ross Jr. YOB: 1970            Patient Care Team:  Shavon Stoddard MD as PCP - General (Internal Medicine)  ______________________________________________________________________    Reason for visit: Establish care/Preventative care  ______________________________________________________________________  Chief Compliant   Headache      ______________________________________________________________________  History of Presenting Illness:  History was obtained from the patient. Eugene Ross Jr. is a 54 y.o. is here for follow up. After the last office visit was seen and evaluated in ED, office and  neurology, for headache    Headache- throbbing, seen by neurology on 6/25 for the same, recommending him to continue tylenol and lifestyle mods  Insomnia poor sleep hygiene, has daytime sleepiness does not workout.  Seizures, generalized tonic-clonic.  Follows up with neurology, is on Depakote 500 XR, and is non complaint   Hyperlipidemia On lipitor 40    Prediabetes  Hba1c 6.1: diet and lifestyle modifications    His PHQ 2 score was 2, negative for anxiety.    STD panel 7.24 negative   Patient is agreeable for getting a colorectal cancer screening done, pending any.  Received flu vaccine pneumococcal vaccine  Non-smoker, nonalcoholic.  He uses marijuana.  Lives with his daughter and cousins.  ______________________________________________________________________  Past Medical/Surgical History:        Diagnosis Date    Acute kidney injury 5/2/2015    Allergic rhinitis     Chronic back pain     on 10/26/19 pt denies pain mgmnt    Hyperlipidemia     Seizures (HCC)     Urinary incontinence            Procedure Laterality Date    COLONOSCOPY N/A 4/30/2025    COLONOSCOPY SNARE POLYPECTOMY HOT

## 2025-08-13 DIAGNOSIS — G47.00 INSOMNIA, UNSPECIFIED TYPE: ICD-10-CM

## (undated) DEVICE — ENDO KIT W/SYRINGE: Brand: MEDLINE INDUSTRIES, INC.

## (undated) DEVICE — ERBE NESSY® OMEGA PLATE USA (85+23)CM² , WITH CABLE 3 M: Brand: ERBE

## (undated) DEVICE — DEFENDO AIR WATER SUCTION AND BIOPSY VALVE KIT FOR  OLYMPUS: Brand: DEFENDO AIR/WATER/SUCTION AND BIOPSY VALVE

## (undated) DEVICE — GLOVE ORANGE PI 8 1/2   MSG9085

## (undated) DEVICE — FORCEPS BX L240CM JAW DIA2.8MM L CAP W/ NDL MIC MESH TOOTH

## (undated) DEVICE — POLYP TRAP: Brand: TRAPEASE®

## (undated) DEVICE — SNARE ENDOSCP L240CM OD24MM LOOP W10MM RND INSUL STIFF BRAID